# Patient Record
Sex: MALE | Race: WHITE | NOT HISPANIC OR LATINO | Employment: FULL TIME | URBAN - METROPOLITAN AREA
[De-identification: names, ages, dates, MRNs, and addresses within clinical notes are randomized per-mention and may not be internally consistent; named-entity substitution may affect disease eponyms.]

---

## 2017-06-13 DIAGNOSIS — B00.1 RECURRENT COLD SORES: ICD-10-CM

## 2017-06-13 RX ORDER — VALACYCLOVIR HYDROCHLORIDE 500 MG/1
500 TABLET, FILM COATED ORAL DAILY
Qty: 30 TABLET | Refills: 0 | Status: SHIPPED | OUTPATIENT
Start: 2017-06-13 | End: 2017-07-10

## 2017-06-13 NOTE — TELEPHONE ENCOUNTER
Patient calling requesting refill Valtrex. Advised he needed yearly exam and this was scheduled.   Medication is being filled for 1 time refill only due to:  Patient needs to be seen because it has been more than one year since last visit.     Gabbie Fleming RN

## 2017-07-10 ENCOUNTER — OFFICE VISIT (OUTPATIENT)
Dept: PEDIATRICS | Facility: CLINIC | Age: 37
End: 2017-07-10
Payer: COMMERCIAL

## 2017-07-10 VITALS
WEIGHT: 178.1 LBS | BODY MASS INDEX: 26.38 KG/M2 | DIASTOLIC BLOOD PRESSURE: 68 MMHG | TEMPERATURE: 97.6 F | OXYGEN SATURATION: 97 % | HEART RATE: 65 BPM | SYSTOLIC BLOOD PRESSURE: 104 MMHG | HEIGHT: 69 IN

## 2017-07-10 DIAGNOSIS — B00.1 RECURRENT COLD SORES: ICD-10-CM

## 2017-07-10 DIAGNOSIS — L30.9 ECZEMA, UNSPECIFIED TYPE: Primary | ICD-10-CM

## 2017-07-10 PROCEDURE — 99213 OFFICE O/P EST LOW 20 MIN: CPT | Performed by: NURSE PRACTITIONER

## 2017-07-10 RX ORDER — VALACYCLOVIR HYDROCHLORIDE 500 MG/1
500 TABLET, FILM COATED ORAL DAILY
Qty: 90 TABLET | Refills: 3 | Status: SHIPPED | OUTPATIENT
Start: 2017-07-10 | End: 2018-07-28

## 2017-07-10 NOTE — PROGRESS NOTES
"  SUBJECTIVE:                                                    Gagan Herrera is a 36 year old male who presents to clinic today for the following health issues:    Medication Followup of Valcyclovir    Taking Medication as prescribed: yes    Side Effects:  None    Medication Helping Symptoms:  yes     ROS: const/derm otherwise negative     OBJECTIVE:  /68 (Cuff Size: Adult Regular)  Pulse 65  Temp 97.6  F (36.4  C) (Tympanic)  Ht 5' 8.5\" (1.74 m)  Wt 178 lb 1.6 oz (80.8 kg)  SpO2 97%  BMI 26.69 kg/m2  CONSTITUTIONAL: Alert, well-nourished, well-groomed, NAD  RESP: Lungs CTA. No wheeze, rhonchi, rales.  CV: HRRR S1 S2 No MRG. No peripheral edema  DERM: No lesions.     ASSESSMENT/PLAN:  (B00.1) Recurrent cold sores  Comment: Controlled on Valtrex daily.   Plan: valACYclovir (VALTREX) 500 MG tablet            (L30.9) Eczema, unspecified type  (primary encounter diagnosis)  Comment: Uses steroid cream PRN.   Plan: Ok to refill if he calls when needing a new tube of cream     Samantha Orozco, FNP-DNP.        "

## 2017-07-10 NOTE — NURSING NOTE
"Chief Complaint   Patient presents with     Recheck Medication     valcyclovir       Initial /68 (Cuff Size: Adult Regular)  Pulse 65  Temp 97.6  F (36.4  C) (Tympanic)  Ht 5' 8.5\" (1.74 m)  Wt 178 lb 1.6 oz (80.8 kg)  SpO2 97%  BMI 26.69 kg/m2 Estimated body mass index is 26.69 kg/(m^2) as calculated from the following:    Height as of this encounter: 5' 8.5\" (1.74 m).    Weight as of this encounter: 178 lb 1.6 oz (80.8 kg).  Medication Reconciliation: complete   Dena Frank CMA    "

## 2017-07-10 NOTE — MR AVS SNAPSHOT
"              After Visit Summary   7/10/2017    Gagan Herrera    MRN: [id number]           Patient Information     Date Of Birth          1980        Visit Information        Provider Department      7/10/2017 11:40 AM Samantha Orozco APRN CNP [facility]        Today's Diagnoses     Eczema, unspecified type    -  1    Recurrent cold sores           Follow-ups after your visit        Who to contact     If you have questions or need follow up information about today's clinic visit or your schedule please contact [facility] directly at [phone].  Normal or non-critical lab and imaging results will be communicated to you by [email or web address], letter or phone within 4 business days after the clinic has received the results. If you do not hear from us within 7 days, please contact the clinic through [email or web address] or phone. If you have a critical or abnormal lab result, we will notify you by phone as soon as possible.  Submit refill requests through [employer] or call your pharmacy and they will forward the refill request to us. Please allow 3 business days for your refill to be completed.          Additional Information About Your Visit        MyChart Information     [employer] lets you send messages to your doctor, view your test results, renew your prescriptions, schedule appointments and more. To sign up, go to [email or web address] . Click on \"Log in\" on the left side of the screen, which will take you to the Welcome page. Then click on \"Sign up Now\" on the right side of the page.     You will be asked to enter the access code listed below, as well as some personal information. Please follow the directions to create your username and password.     Your access code is: [email or web address]  Expires: 2017  1:13 PM     Your access code will  in 90 days. If you need help or a new code, please call your [facility] or [phone].        Care EveryWhere ID     This is your Care EveryWhere ID. " "This could be used by other organizations to access your [place] medical records  [id number]        Your Vitals Were     Pulse Temperature Height Pulse Oximetry BMI (Body Mass Index)       65 97.6  F (36.4  C) (Tympanic) 5' 8.5\" (1.74 m) 97% 26.69 kg/m2        Blood Pressure from Last 3 Encounters:   07/10/17 104/68   12/09/16 106/67   05/27/16 90/58    Weight from Last 3 Encounters:   07/10/17 178 lb 1.6 oz (80.8 kg)   12/09/16 172 lb 8 oz (78.2 kg)   05/27/16 162 lb (73.5 kg)              Today, you had the following     No orders found for display         Where to get your medicines      These medications were sent to [id number]  [place] - [address] AT [facility]  [address] [place] [address]     Phone:  [phone]     valACYclovir 500 MG tablet          Primary Care Provider Office Phone # Fax #    Cherie Magana [phone]       [address] [place] [address]        Equal Access to Services     LUIS GARY AH: Hadii luis ku hadasho Soomaali, waaxda luqadaha, qaybta kaalmada adeegyada, melissa connors. So [facility] [phone].    ATENCIÓN: Si habla español, tiene a aly disposición servicios gratuitos de asistencia lingüística. Llame al [phone].    We comply with applicable federal civil rights laws and [place] laws. We do not discriminate on the basis of race, color, national origin, age, disability sex, sexual orientation or gender identity.            Thank you!     Thank you for choosing [facility]  for your care. Our goal is always to provide you with excellent care. Hearing back from our patients is one way we can continue to improve our services. Please take a few minutes to complete the written survey that you may receive in the mail after your visit with us. Thank you!             Your Updated Medication List - Protect others around you: Learn how to safely " use, store and throw away your medicines at www.disposemymeds.org.          This list is accurate as of: 7/10/17 11:59 AM.  Always use your most recent med list.                   Brand Name Dispense Instructions for use Diagnosis    mometasone 0.1 % cream    ELOCON    15 g    Apply topically daily as needed    Eczema       valACYclovir 500 MG tablet    VALTREX    90 tablet    Take 1 tablet (500 mg) by mouth daily    Recurrent cold sores

## 2017-07-11 ASSESSMENT — PATIENT HEALTH QUESTIONNAIRE - PHQ9: SUM OF ALL RESPONSES TO PHQ QUESTIONS 1-9: 5

## 2017-08-18 ENCOUNTER — TRANSFERRED RECORDS (OUTPATIENT)
Dept: HEALTH INFORMATION MANAGEMENT | Facility: CLINIC | Age: 37
End: 2017-08-18

## 2017-08-21 ENCOUNTER — NURSE TRIAGE (OUTPATIENT)
Dept: NURSING | Facility: CLINIC | Age: 37
End: 2017-08-21

## 2017-08-21 ENCOUNTER — HOSPITAL ENCOUNTER (EMERGENCY)
Facility: CLINIC | Age: 37
Discharge: HOME | End: 2017-08-21
Attending: EMERGENCY MEDICINE | Admitting: EMERGENCY MEDICINE
Payer: COMMERCIAL

## 2017-08-21 ENCOUNTER — APPOINTMENT (OUTPATIENT)
Dept: ULTRASOUND IMAGING | Facility: CLINIC | Age: 37
End: 2017-08-21
Attending: EMERGENCY MEDICINE
Payer: COMMERCIAL

## 2017-08-21 VITALS
TEMPERATURE: 97.6 F | SYSTOLIC BLOOD PRESSURE: 122 MMHG | DIASTOLIC BLOOD PRESSURE: 68 MMHG | RESPIRATION RATE: 16 BRPM | HEART RATE: 73 BPM | OXYGEN SATURATION: 99 %

## 2017-08-21 DIAGNOSIS — N10 ACUTE PYELONEPHRITIS: ICD-10-CM

## 2017-08-21 LAB
ALBUMIN UR-MCNC: 100 MG/DL
ANION GAP SERPL CALCULATED.3IONS-SCNC: 5 MMOL/L (ref 3–14)
APPEARANCE UR: ABNORMAL
BACTERIA #/AREA URNS HPF: ABNORMAL /HPF
BASOPHILS # BLD AUTO: 0 10E9/L (ref 0–0.2)
BASOPHILS NFR BLD AUTO: 0.3 %
BILIRUB UR QL STRIP: NEGATIVE
BUN SERPL-MCNC: 12 MG/DL (ref 7–30)
CALCIUM SERPL-MCNC: 8.9 MG/DL (ref 8.5–10.1)
CHLORIDE SERPL-SCNC: 98 MMOL/L (ref 94–109)
CO2 SERPL-SCNC: 29 MMOL/L (ref 20–32)
COLOR UR AUTO: ABNORMAL
CREAT SERPL-MCNC: 0.92 MG/DL (ref 0.66–1.25)
DIFFERENTIAL METHOD BLD: ABNORMAL
EOSINOPHIL # BLD AUTO: 0 10E9/L (ref 0–0.7)
EOSINOPHIL NFR BLD AUTO: 0.3 %
ERYTHROCYTE [DISTWIDTH] IN BLOOD BY AUTOMATED COUNT: 11.5 % (ref 10–15)
GFR SERPL CREATININE-BSD FRML MDRD: >90 ML/MIN/1.7M2
GLUCOSE SERPL-MCNC: 101 MG/DL (ref 70–99)
GLUCOSE UR STRIP-MCNC: NEGATIVE MG/DL
HCT VFR BLD AUTO: 40.2 % (ref 40–53)
HGB BLD-MCNC: 14 G/DL (ref 13.3–17.7)
HGB UR QL STRIP: ABNORMAL
IMM GRANULOCYTES # BLD: 0 10E9/L (ref 0–0.4)
IMM GRANULOCYTES NFR BLD: 0.3 %
KETONES UR STRIP-MCNC: NEGATIVE MG/DL
LEUKOCYTE ESTERASE UR QL STRIP: ABNORMAL
LYMPHOCYTES # BLD AUTO: 0.9 10E9/L (ref 0.8–5.3)
LYMPHOCYTES NFR BLD AUTO: 7.7 %
MCH RBC QN AUTO: 30.5 PG (ref 26.5–33)
MCHC RBC AUTO-ENTMCNC: 34.8 G/DL (ref 31.5–36.5)
MCV RBC AUTO: 88 FL (ref 78–100)
MONOCYTES # BLD AUTO: 1.1 10E9/L (ref 0–1.3)
MONOCYTES NFR BLD AUTO: 9.7 %
MUCOUS THREADS #/AREA URNS LPF: PRESENT /LPF
NEUTROPHILS # BLD AUTO: 9.3 10E9/L (ref 1.6–8.3)
NEUTROPHILS NFR BLD AUTO: 81.7 %
NITRATE UR QL: POSITIVE
NRBC # BLD AUTO: 0 10*3/UL
NRBC BLD AUTO-RTO: 0 /100
PH UR STRIP: 7 PH (ref 5–7)
PLATELET # BLD AUTO: 205 10E9/L (ref 150–450)
POTASSIUM SERPL-SCNC: 4.5 MMOL/L (ref 3.4–5.3)
RBC # BLD AUTO: 4.59 10E12/L (ref 4.4–5.9)
RBC #/AREA URNS AUTO: 4 /HPF (ref 0–2)
SODIUM SERPL-SCNC: 132 MMOL/L (ref 133–144)
SOURCE: ABNORMAL
SP GR UR STRIP: 1.01 (ref 1–1.03)
UROBILINOGEN UR STRIP-MCNC: 4 MG/DL (ref 0–2)
WBC # BLD AUTO: 11.4 10E9/L (ref 4–11)
WBC #/AREA URNS AUTO: >182 /HPF (ref 0–2)
WBC CLUMPS #/AREA URNS HPF: PRESENT /HPF

## 2017-08-21 PROCEDURE — 85025 COMPLETE CBC W/AUTO DIFF WBC: CPT | Performed by: EMERGENCY MEDICINE

## 2017-08-21 PROCEDURE — 96361 HYDRATE IV INFUSION ADD-ON: CPT

## 2017-08-21 PROCEDURE — 87086 URINE CULTURE/COLONY COUNT: CPT | Performed by: EMERGENCY MEDICINE

## 2017-08-21 PROCEDURE — 96365 THER/PROPH/DIAG IV INF INIT: CPT

## 2017-08-21 PROCEDURE — 81001 URINALYSIS AUTO W/SCOPE: CPT | Performed by: EMERGENCY MEDICINE

## 2017-08-21 PROCEDURE — 87088 URINE BACTERIA CULTURE: CPT | Performed by: EMERGENCY MEDICINE

## 2017-08-21 PROCEDURE — 99285 EMERGENCY DEPT VISIT HI MDM: CPT | Mod: 25

## 2017-08-21 PROCEDURE — 87186 SC STD MICRODIL/AGAR DIL: CPT | Performed by: EMERGENCY MEDICINE

## 2017-08-21 PROCEDURE — 96375 TX/PRO/DX INJ NEW DRUG ADDON: CPT

## 2017-08-21 PROCEDURE — 25000128 H RX IP 250 OP 636: Performed by: EMERGENCY MEDICINE

## 2017-08-21 PROCEDURE — 76770 US EXAM ABDO BACK WALL COMP: CPT

## 2017-08-21 PROCEDURE — 80048 BASIC METABOLIC PNL TOTAL CA: CPT | Performed by: EMERGENCY MEDICINE

## 2017-08-21 PROCEDURE — 25000132 ZZH RX MED GY IP 250 OP 250 PS 637: Performed by: EMERGENCY MEDICINE

## 2017-08-21 RX ORDER — KETOROLAC TROMETHAMINE 30 MG/ML
30 INJECTION, SOLUTION INTRAMUSCULAR; INTRAVENOUS ONCE
Status: COMPLETED | OUTPATIENT
Start: 2017-08-21 | End: 2017-08-21

## 2017-08-21 RX ORDER — CEFTRIAXONE 1 G/1
1 INJECTION, POWDER, FOR SOLUTION INTRAMUSCULAR; INTRAVENOUS ONCE
Status: COMPLETED | OUTPATIENT
Start: 2017-08-21 | End: 2017-08-21

## 2017-08-21 RX ORDER — CEPHALEXIN 500 MG/1
500 CAPSULE ORAL 4 TIMES DAILY
Qty: 28 CAPSULE | Refills: 0 | Status: SHIPPED | OUTPATIENT
Start: 2017-08-21 | End: 2017-08-28

## 2017-08-21 RX ORDER — ONDANSETRON 2 MG/ML
4 INJECTION INTRAMUSCULAR; INTRAVENOUS ONCE
Status: COMPLETED | OUTPATIENT
Start: 2017-08-21 | End: 2017-08-21

## 2017-08-21 RX ORDER — SODIUM CHLORIDE 9 MG/ML
1000 INJECTION, SOLUTION INTRAVENOUS CONTINUOUS
Status: DISCONTINUED | OUTPATIENT
Start: 2017-08-21 | End: 2017-08-21 | Stop reason: HOSPADM

## 2017-08-21 RX ADMIN — SODIUM CHLORIDE 1000 ML: 9 INJECTION, SOLUTION INTRAVENOUS at 08:25

## 2017-08-21 RX ADMIN — FEXOFENADINE HYDROCHLORIDE 60 MG: 30 TABLET, ORALLY DISINTEGRATING ORAL at 10:34

## 2017-08-21 RX ADMIN — KETOROLAC TROMETHAMINE 30 MG: 30 INJECTION, SOLUTION INTRAMUSCULAR at 08:26

## 2017-08-21 RX ADMIN — CEFTRIAXONE SODIUM 1 G: 1 INJECTION, POWDER, FOR SOLUTION INTRAMUSCULAR; INTRAVENOUS at 09:50

## 2017-08-21 RX ADMIN — ONDANSETRON 4 MG: 2 INJECTION INTRAMUSCULAR; INTRAVENOUS at 08:25

## 2017-08-21 ASSESSMENT — ENCOUNTER SYMPTOMS
HEADACHES: 1
FLANK PAIN: 1
ABDOMINAL PAIN: 1
CHILLS: 1
HEMATURIA: 0
NAUSEA: 1
DIAPHORESIS: 1

## 2017-08-21 NOTE — DISCHARGE INSTRUCTIONS
"   * BLADDER INFECTION: Male (Adult)    A bladder infection (\"cystitis\" or \"UTI\") usually causes a constant urge to urinate, and a burning when passing urine. Urine may be cloudy, smelly or dark. There may be also be pain in the lower abdomen.  Cystitis in males is not common. It may be caused by a partial blockage in the urinary system that keeps the bladder from emptying completely. This is most often related to an enlarged prostate gland.  HOME CARE:  1. Drink lots of fluids (at least 6-8 glasses a day). This will flush the bacteria out of your bladder. Cranberry juice has been shown to help clear out the bacteria.  2. Avoid sexual intercourse until your symptoms are gone.  3. A bladder infection is treated with antibiotics. You may also be given Pyridium (generic - phenazopyridine) to reduce burning with urination. This will cause urine to become a bright orange color, which can stain clothing.  FOLLOW UP with your doctor or this facility if ALL symptoms have not cleared within five days. It is important to keep your follow up appointment to discuss with your doctor the need for further tests of the urinary tract.  GET PROMPT MEDICAL ATTENTION if any of the following occur:    Fever over 101  F (38.3  C)    No improvement by the third day of treatment    Increasing back or abdominal pain    Repeated vomiting; unable to keep medicine down    Weakness, dizziness or fainting    9966-5047 [employer], [address] [place] [address], [place] [address]. All rights reserved. This information is not intended as a substitute for professional medical care. Always follow your healthcare professional's instructions.    "

## 2017-08-21 NOTE — ED AVS SNAPSHOT
[facility] Emergency Department    [address]    [facility] [address]    Phone:  [phone]    Fax:  [phone]                                       Gagan Herrera   MRN: [id number]    Department:  [facility] Emergency Department   Date of Visit:  8/21/2017           After Visit Summary Signature Page     I have received my discharge instructions, and my questions have been answered. I have discussed any challenges I see with this plan with the nurse or doctor.    ..........................................................................................................................................  Patient/Patient Representative Signature      ..........................................................................................................................................  Patient Representative Print Name and Relationship to Patient    ..................................................               ................................................  Date                                            Time    ..........................................................................................................................................  Reviewed by Signature/Title    ...................................................              ..............................................  Date                                                            Time

## 2017-08-21 NOTE — ED NOTES
Pt arrives with c/o R flank pain that started today. Pt dx on Friday nigth dx with uti bactrum, pyridium. Nausea, no vomiting, blood in urine. ABC intact.

## 2017-08-21 NOTE — ED PROVIDER NOTES
History     Chief Complaint:  UTI    The history is provided by the patient.      Gagan Herrera is a 36 year old male who presents with symptoms of a UTI. The patient states he went his local urgency room 3 days ago, where he was diagnosed with a UTI and given oral bactrim. He has since noted flank pain, which is a new symptom, as well as a continuation of his previous symptoms, prompting his visit to the emergency department. These symptoms include abdominal tenderness, chills, headache, night sweats, and nausea. The patient denies blood in the urine, and states no other concerns at this time. He has taken some of his wife's old Pyridium for his pain.     Allergies:  No known drug allergies.      Medications:    Valtrex   Elocon     Past Medical History:    History reviewed.  No significant past medical history.      Past Surgical History:     Appendectomy   Sinus surgery     Family History:    Cancer  MI  Chronic obstructive pulmonary disease  Adopted    Social History:  Presents to the emergency department with his family  Marital Status:  Single  Smoking status: Former smoker  Alcohol use: Yes      Review of Systems   Constitutional: Positive for chills and diaphoresis.   HENT: Positive for congestion.    Gastrointestinal: Positive for abdominal pain and nausea.   Genitourinary: Positive for flank pain. Negative for hematuria.   Neurological: Positive for headaches.   All other systems reviewed and are negative.    Physical Exam     Patient Vitals for the past 24 hrs:   BP Temp Temp src Heart Rate Resp SpO2   08/21/17 0800 124/72 - - - - 98 %   08/21/17 0750 120/86 97.6  F (36.4  C) Oral 91 12 95 %         Physical Exam  Constitutional: Patient appears well-developed and well-nourished. Patient is in mild distress  HENT:                         Head: No external signs of trauma noted.                        Eyes: Conjunctivae are normal. Pupils are equal, round, and reactive to light.              Nose: Nasal  congestion noted.  Cardiovascular:                         Normal rate, regular rhythm and normal heart sounds.                          Exam reveals no friction rub.                          No murmur heard.  Pulmonary/Chest:                         Effort normal and breath sounds normal.                         No respiratory distress.                         There are no wheezes.                         There are no rales.   Abdominal:                         Soft.                         Bowel sounds normal.                         There is no distension.                         There is no tenderness.                         There is no rebound or guarding.   Musculoskeletal:                         Normal range of motion.                         Normal Tone                        There is very mild L>R CVA tenderness  Neurological: Patient is alert and oriented to person, place, and time.   Skin: Skin is warm and dry. Patient is not diaphoretic.     Emergency Department Course   Imaging:  Radiology findings were communicated with the patient who voiced understanding of the findings.  US Retroperitoneal:  IMPRESSION: There is increased echogenicity of the urine in the  urinary bladder and in the renal pelves bilaterally. Renal ultrasound  otherwise normal.  Report per radiology      Laboratory:  Laboratory findings were communicated with the patient who voiced understanding of the findings.  CBC: WBC 11.4 (H), o/w WNL. (HGB 14.0, )   BMP:  (L), glucose 101 (H), o/w WNL (Creatinine 0.92)   UA: Clear yellow urine, large blood, almbumin 100, urobillinogen 4.0 (H), nitrite positive, larger leuk esterase, WBC > 182, RBC 4 (H), WBC clumps present, bacteria many, mucus present, otherwise WNL    Urine Culture Aerobic Bacterial: Pending     Interventions:  0825: NS Bolus 1,000mL IV   0825: Zofran, 4 mg, IV   0826: Toradol, 30 mg, IV injection   1031: Rocephin 1 g in 100 mL NS  1034: Allegra ODT 60 mg       Emergency Department Course:  Nursing notes and vitals reviewed.  I performed an exam of the patient as documented above.   The patient was sent for a US Retroperitoneal while in the emergency department, results above.   IV was inserted and blood was drawn for laboratory testing, results above.  The patient provided a urine sample here in the emergency department. This was sent for laboratory testing, findings above.  0909: I spoke with Dr. Magana of the internal medicine service regarding patient's presentation, findings, and plan of care.    0917: I spoke with the [facility] nurse regarding patient's presentation, findings, and plan of care. They could not find specificity testing. Upon review of the [facility] note, they did find the patient's infection to be 10,000-50,000 E-Coli cfu/mL.  0936: Patient rechecked and updated.   I discussed the treatment plan with the patient. They expressed understanding of this plan and consented to discharge. They will be discharged home with instructions for care and follow up. In addition, the patient will return to the emergency department if their symptoms persist, worsen, if new symptoms arise or if there is any concern.  All questions were answered.     I personally reviewed the laboratory and imaging results with the Patient and answered all related questions prior to discharge.      Impression & Plan      Medical Decision Making:   Gagan Herrera is a 36 year old male who presents for evaluation of back pain. The patient initially stated he went to his PCP last firday, where he was diagnosed with a urinary tract infection. He states that since being on bactrim he has had chills over the wekened and increasing flank pain which he did not have on Friday. His blood work demonstrates a very mild elevation in his white blood cell count. Ultrasound does not reveal any hydronephrosis, but does note what is probably cloudy urine in the bladder. I called his primary care  clinic, but he did not get care there. Upon Care Everywhere review, he was seen at the [facility] in [place]. I discussed the UA and UCx with the nurse there, who could not find any antibiotic testing, but could see the culture grew 10,000-50,000 E-Coli CFU/mL. The patient mentioned that his wife fell ill with a UTI several months ago and her antibiotics needed to be changed as well. He states that she waited long enough that she actually had to be admitted for 4 days for IV abx. He wondered if intercourse with her could have given him the UTI. There is no report of discharge or STI risks. The patient feels well and I believe is stable for discharge. I will change his Abx and await further susceptibility testing from his UA today. At this time, we will discharge the patient home and he will follow up in the outpatient setting. Anticipatory guidance given prior to discharge.     Diagnosis:    ICD-10-CM    1. Acute pyelonephritis N10       Disposition:   Discharged to home with the below prescription      Discharge Medications:  New Prescriptions    CEPHALEXIN (KEFLEX) 500 MG CAPSULE    Take 1 capsule (500 mg) by mouth 4 times daily for 7 days     Scribe Disclosure:  William CATES, am serving as a scribe at 7:47 AM on 8/21/2017 to document services personally performed by Aashish Shen DO, based on my observations and the provider's statements to me.   8/21/2017   [facility] EMERGENCY DEPARTMENT       Aashish Shen DO  08/21/17 1100

## 2017-08-21 NOTE — ED AVS SNAPSHOT
" [facility]    [address]    [facility] [address]    Phone:  [phone]    Fax:  [phone]                                       Gagan Herrera   MRN: [id number]    Department:  [facility]   Date of Visit:  8/21/2017           Patient Information     Date Of Birth          1980        Your diagnoses for this visit were:     Acute pyelonephritis        You were seen by Aashish Shen DO.      Follow-up Information     Follow up with Cherie Magana MD. Call in 2 days.    Specialties:  Internal Medicine, Pediatrics    Why:  For re-evaluation    Contact information:    [facility] [place] [address] [phone]          Follow up with Your urologist.    Why:  For further evaluation        Follow up with [facility].    Specialty:  EMERGENCY MEDICINE    Why:  If symptoms worsen    Contact information:    [address]  [facility] [id number] [phone]        Discharge Instructions          * BLADDER INFECTION: Male (Adult)    A bladder infection (\"cystitis\" or \"UTI\") usually causes a constant urge to urinate, and a burning when passing urine. Urine may be cloudy, smelly or dark. There may be also be pain in the lower abdomen.  Cystitis in males is not common. It may be caused by a partial blockage in the urinary system that keeps the bladder from emptying completely. This is most often related to an enlarged prostate gland.  HOME CARE:  1. Drink lots of fluids (at least 6-8 glasses a day). This will flush the bacteria out of your bladder. Cranberry juice has been shown to help clear out the bacteria.  2. Avoid sexual intercourse until your symptoms are gone.  3. A bladder infection is treated with antibiotics. You may also be given Pyridium (generic - phenazopyridine) to reduce burning with urination. This will cause urine to become a bright " orange color, which can stain clothing.  FOLLOW UP with your doctor or this facility if ALL symptoms have not cleared within five days. It is important to keep your follow up appointment to discuss with your doctor the need for further tests of the urinary tract.  GET PROMPT MEDICAL ATTENTION if any of the following occur:    Fever over 101  F (38.3  C)    No improvement by the third day of treatment    Increasing back or abdominal pain    Repeated vomiting; unable to keep medicine down    Weakness, dizziness or fainting    5188-1050 [employer], [address] [place] [address], [place] [address]. All rights reserved. This information is not intended as a substitute for professional medical care. Always follow your healthcare professional's instructions.      Future Appointments        Provider Department Dept Phone Center    8/29/2017 8:00 AM Opal Messer PA-C, [facility] [place] [phone]  [place]      24 Hour Appointment Hotline       To make an appointment at any [facility], call [phone] ([phone]). If you don't have a family doctor or clinic, we will help you find one. [facility] are conveniently located to serve the needs of you and your family.             Review of your medicines      START taking        Dose / Directions Last dose taken    cephALEXin 500 MG capsule   Commonly known as:  KEFLEX   Dose:  500 mg   Quantity:  28 capsule        Take 1 capsule (500 mg) by mouth 4 times daily for 7 days   Refills:  0          Our records show that you are taking the medicines listed below. If these are incorrect, please call your family doctor or clinic.        Dose / Directions Last dose taken    mometasone 0.1 % cream   Commonly known as:  ELOCON   Quantity:  15 g        Apply topically daily as needed   Refills:  1        PYRIDIUM PO        Refills:  0        valACYclovir 500 MG tablet   Commonly known as:  VALTREX   Dose:  500 mg    Quantity:  90 tablet        Take 1 tablet (500 mg) by mouth daily   Refills:  3          STOP taking        Dose Reason for stopping Comments    BACTRIM PO                      Prescriptions were sent or printed at these locations (1 Prescription)                   Other Prescriptions                Printed at Department/Unit printer (1 of 1)         cephALEXin (KEFLEX) 500 MG capsule                Procedures and tests performed during your visit     Basic metabolic panel    CBC with platelets differential    Peripheral IV catheter    Retroperitoneal US    UA with Microscopic    Urine Culture      Orders Needing Specimen Collection     None      Pending Results     Date and Time Order Name Status Description    8/21/2017 0836 Urine Culture In process             Pending Culture Results     Date and Time Order Name Status Description    8/21/2017 0836 Urine Culture In process             Pending Results Instructions     If you had any lab results that were not finalized at the time of your Discharge, you can call the ED Lab Result RN at [phone]. You will be contacted by this team for any positive Lab results or changes in treatment. The nurses are available 7 days a week from 10A to 6:30P.  You can leave a message 24 hours per day and they will return your call.        Test Results From Your Hospital Stay        8/21/2017  8:48 AM      Component Results     Component Value Ref Range & Units Status    WBC 11.4 (H) 4.0 - 11.0 10e9/L Final    RBC Count 4.59 4.4 - 5.9 10e12/L Final    Hemoglobin 14.0 13.3 - 17.7 g/dL Final    Hematocrit 40.2 40.0 - 53.0 % Final    MCV 88 78 - 100 fl Final    MCH 30.5 26.5 - 33.0 pg Final    MCHC 34.8 31.5 - 36.5 g/dL Final    RDW 11.5 10.0 - 15.0 % Final    Platelet Count 205 150 - 450 10e9/L Final    Diff Method Automated Method  Final    % Neutrophils 81.7 % Final    % Lymphocytes 7.7 % Final    % Monocytes 9.7 % Final    % Eosinophils 0.3 % Final    % Basophils 0.3 % Final     % Immature Granulocytes 0.3 % Final    Nucleated RBCs 0 0 /100 Final    Absolute Neutrophil 9.3 (H) 1.6 - 8.3 10e9/L Final    Absolute Lymphocytes 0.9 0.8 - 5.3 10e9/L Final    Absolute Monocytes 1.1 0.0 - 1.3 10e9/L Final    Absolute Eosinophils 0.0 0.0 - 0.7 10e9/L Final    Absolute Basophils 0.0 0.0 - 0.2 10e9/L Final    Abs Immature Granulocytes 0.0 0 - 0.4 10e9/L Final    Absolute Nucleated RBC 0.0  Final         8/21/2017  9:01 AM      Component Results     Component Value Ref Range & Units Status    Sodium 132 (L) 133 - 144 mmol/L Final    Potassium 4.5 3.4 - 5.3 mmol/L Final    Chloride 98 94 - 109 mmol/L Final    Carbon Dioxide 29 20 - 32 mmol/L Final    Anion Gap 5 3 - 14 mmol/L Final    Glucose 101 (H) 70 - 99 mg/dL Final    Urea Nitrogen 12 7 - 30 mg/dL Final    Creatinine 0.92 0.66 - 1.25 mg/dL Final    GFR Estimate >90 >60 mL/min/1.7m2 Final    Non  GFR Calc    GFR Estimate If Black >90 >60 mL/min/1.7m2 Final    African American GFR Calc    Calcium 8.9 8.5 - 10.1 mg/dL Final         8/21/2017  9:04 AM      Narrative     ULTRASOUND RETROPERITONEAL COMPLETE  8/21/2017 8:51 AM     HISTORY: Flank pain, possible pyelonephritis.    COMPARISON: None.    FINDINGS: The bilateral renal parenchyma are normal in echogenicity  without evidence for shadowing stone or mass. No renal cysts. No  hydronephrosis. Right kidney measures 10.7 x 5.0 x 5.2 cm and the left  measures 10.3 x 4.4 x 4.8 cm. Cortical thickness is 1.5 cm on the  right and 1.6 cm on the left. Bladder is normal in appearance but the  urine is cloudy. There is also cloudy urine in the visualized portions  of bilateral renal pelves.        Impression     IMPRESSION: There is increased echogenicity of the urine in the  urinary bladder and in the renal pelves bilaterally. Renal ultrasound  otherwise normal.    KIZZY KAISER MD         8/21/2017  9:14 AM      Component Results     Component Value Ref Range & Units Status    Color Urine  Gracie  Final    Appearance Urine Cloudy  Final    Glucose Urine Negative NEG^Negative mg/dL Final    Bilirubin Urine Negative NEG^Negative Final    Ketones Urine Negative NEG^Negative mg/dL Final    Specific Gravity Urine 1.006 1.003 - 1.035 Final    Blood Urine Large (A) NEG^Negative Final    pH Urine 7.0 5.0 - 7.0 pH Final    Protein Albumin Urine 100 (A) NEG^Negative mg/dL Final    Urobilinogen mg/dL 4.0 (H) 0.0 - 2.0 mg/dL Final    Nitrite Urine Positive (A) NEG^Negative Final    Leukocyte Esterase Urine Large (A) NEG^Negative Final    Source Midstream Urine  Final    WBC Urine >182 (H) 0 - 2 /HPF Final    RBC Urine 4 (H) 0 - 2 /HPF Final    WBC Clumps Present (A) NEG^Negative /HPF Final    Bacteria Urine Many (A) NEG^Negative /HPF Final    Mucous Urine Present (A) NEG^Negative /LPF Final         8/21/2017  9:04 AM                Clinical Quality Measure: Blood Pressure Screening     Your blood pressure was checked while you were in the emergency department today. The last reading we obtained was  BP: 124/72 . Please read the guidelines below about what these numbers mean and what you should do about them.  If your systolic blood pressure (the top number) is less than 120 and your diastolic blood pressure (the bottom number) is less than 80, then your blood pressure is normal. There is nothing more that you need to do about it.  If your systolic blood pressure (the top number) is 120-139 or your diastolic blood pressure (the bottom number) is 80-89, your blood pressure may be higher than it should be. You should have your blood pressure rechecked within a year by a primary care provider.  If your systolic blood pressure (the top number) is 140 or greater or your diastolic blood pressure (the bottom number) is 90 or greater, you may have high blood pressure. High blood pressure is treatable, but if left untreated over time it can put you at risk for heart attack, stroke, or kidney failure. You should have your  "blood pressure rechecked by a primary care provider within the next 4 weeks.  If your provider in the emergency department today gave you specific instructions to follow-up with your doctor or provider even sooner than that, you should follow that instruction and not wait for up to 4 weeks for your follow-up visit.        Thank you for choosing [facility]       Thank you for choosing [facility] for your care. Our goal is always to provide you with excellent care. Hearing back from our patients is one way we can continue to improve our services. Please take a few minutes to complete the written survey that you may receive in the mail after you visit with us. Thank you!        [employer] Information     [employer] lets you send messages to your doctor, view your test results, renew your prescriptions, schedule appointments and more. To sign up, go to [email or web address] . Click on \"Log in\" on the left side of the screen, which will take you to the Welcome page. Then click on \"Sign up Now\" on the right side of the page.     You will be asked to enter the access code listed below, as well as some personal information. Please follow the directions to create your username and password.     Your access code is: [email or web address]  Expires: 2017  1:13 PM     Your access code will  in 90 days. If you need help or a new code, please call your [facility] clinic or [phone].        Care EveryWhere ID     This is your Care EveryWhere ID. This could be used by other organizations to access your [facility] medical records  [id number]        Equal Access to Services     BULMARO GARY : Hadii luis goodman hadasho Soomaali, waaxda luqadaha, qaybta kaalmada hollandyada, melissa ellis . So [facility] [phone].    ATENCIÓN: Si habla español, tiene a aly disposición servicios gratuitos de asistencia lingüística. Llame al [phone].    We comply with applicable federal civil rights laws and [place] laws. We do not " discriminate on the basis of race, color, national origin, age, disability sex, sexual orientation or gender identity.            After Visit Summary       This is your record. Keep this with you and show to your community pharmacist(s) and doctor(s) at your next visit.

## 2017-08-22 LAB
BACTERIA SPEC CULT: ABNORMAL
Lab: ABNORMAL
SPECIMEN SOURCE: ABNORMAL

## 2017-08-22 NOTE — TELEPHONE ENCOUNTER
"  Additional Information    Negative: [1] Caller is not with the adult (patient) AND [2] reporting urgent symptoms    Negative: Lab result questions    Negative: Medication questions    Negative: Caller cannot be reached by phone    Negative: Caller has already spoken to PCP or another triager    Negative: RN needs further essential information from caller in order to complete triage    Negative: Requesting regular office appointment    Negative: [1] Caller requesting NON-URGENT health information AND [2] PCP's office is the best resource    Health Information question, no triage required and triager able to answer question    Answer Assessment - Initial Assessment Questions  1. REASON FOR CALL or QUESTION: \"What is your reason for calling today?\" or \"How can I best help you?\" or \"What question do you have that I can help answer?\"      Is it OK to take current antibiotic with his Valtrex?    Protocols used: INFORMATION ONLY CALL-ADULT-    "

## 2017-08-23 ENCOUNTER — TRANSFERRED RECORDS (OUTPATIENT)
Dept: HEALTH INFORMATION MANAGEMENT | Facility: CLINIC | Age: 37
End: 2017-08-23

## 2017-08-25 DIAGNOSIS — N39.0 URINARY TRACT INFECTION, SITE UNSPECIFIED: Primary | ICD-10-CM

## 2017-08-29 ENCOUNTER — NURSE TRIAGE (OUTPATIENT)
Dept: NURSING | Facility: CLINIC | Age: 37
End: 2017-08-29

## 2017-08-29 ENCOUNTER — TELEPHONE (OUTPATIENT)
Dept: PEDIATRICS | Facility: CLINIC | Age: 37
End: 2017-08-29

## 2017-08-29 ENCOUNTER — OFFICE VISIT (OUTPATIENT)
Dept: PEDIATRICS | Facility: CLINIC | Age: 37
End: 2017-08-29
Payer: COMMERCIAL

## 2017-08-29 VITALS
SYSTOLIC BLOOD PRESSURE: 102 MMHG | OXYGEN SATURATION: 98 % | TEMPERATURE: 97.8 F | HEART RATE: 80 BPM | DIASTOLIC BLOOD PRESSURE: 60 MMHG | WEIGHT: 178 LBS | BODY MASS INDEX: 26.67 KG/M2

## 2017-08-29 DIAGNOSIS — N30.00 ACUTE CYSTITIS WITHOUT HEMATURIA: ICD-10-CM

## 2017-08-29 DIAGNOSIS — R82.90 NONSPECIFIC FINDING ON EXAMINATION OF URINE: ICD-10-CM

## 2017-08-29 DIAGNOSIS — R30.0 DYSURIA: Primary | ICD-10-CM

## 2017-08-29 LAB
ALBUMIN UR-MCNC: NEGATIVE MG/DL
APPEARANCE UR: ABNORMAL
BACTERIA #/AREA URNS HPF: ABNORMAL /HPF
BILIRUB UR QL STRIP: NEGATIVE
COLOR UR AUTO: YELLOW
GLUCOSE UR STRIP-MCNC: NEGATIVE MG/DL
HGB UR QL STRIP: NEGATIVE
KETONES UR STRIP-MCNC: NEGATIVE MG/DL
LEUKOCYTE ESTERASE UR QL STRIP: ABNORMAL
NITRATE UR QL: NEGATIVE
PH UR STRIP: 6.5 PH (ref 5–7)
RBC #/AREA URNS AUTO: ABNORMAL /HPF
SOURCE: ABNORMAL
SP GR UR STRIP: 1.01 (ref 1–1.03)
UROBILINOGEN UR STRIP-ACNC: 0.2 EU/DL (ref 0.2–1)
WBC #/AREA URNS AUTO: ABNORMAL /HPF

## 2017-08-29 PROCEDURE — 87086 URINE CULTURE/COLONY COUNT: CPT | Performed by: INTERNAL MEDICINE

## 2017-08-29 PROCEDURE — 87088 URINE BACTERIA CULTURE: CPT | Performed by: INTERNAL MEDICINE

## 2017-08-29 PROCEDURE — 81001 URINALYSIS AUTO W/SCOPE: CPT | Performed by: INTERNAL MEDICINE

## 2017-08-29 PROCEDURE — 99213 OFFICE O/P EST LOW 20 MIN: CPT | Performed by: INTERNAL MEDICINE

## 2017-08-29 PROCEDURE — 87186 SC STD MICRODIL/AGAR DIL: CPT | Performed by: INTERNAL MEDICINE

## 2017-08-29 RX ORDER — CIPROFLOXACIN 500 MG/1
500 TABLET, FILM COATED ORAL 2 TIMES DAILY
Qty: 20 TABLET | Refills: 0 | Status: SHIPPED | OUTPATIENT
Start: 2017-08-29 | End: 2017-09-13

## 2017-08-29 NOTE — PATIENT INSTRUCTIONS
Cipro 500 mg   1 pill twice per day for 10 days    Take a probiotic for the next few weeks    Call to set up a urology visit

## 2017-08-29 NOTE — PROGRESS NOTES
SUBJECTIVE:   Gagan Herrera is a 36 year old male who presents to clinic today for the following health issues:      ED/UC Followup:    Facility:  [facility]  Date of visit: 08/21/2017  Reason for visit: Acute pyelonephritis  Current Status: Pt states it was feeling much better until this morning it feel like there is a little bit of a tingle when her urinates.      Initially was treated w/ Bactrim. Sx did not improve.  On repeat evaluation had persistent pyuria.   Treated w/ Rocephin IV then cephalexin.  UCx grew out E coli, R to Bactrim.  Sx improved, but now sx are returning over the past day. Tingling. No fever or chills. No pelvic pain.    Reviewed hx, had a proteus UTI in 2016.    Circumcised.  No prior hx of UTI other than these 2 episodes.       Problem list and histories reviewed & adjusted, as indicated.    Labs reviewed in EPIC    Reviewed and updated as needed this visit by clinical staff  Tobacco  Allergies  Meds  Med Hx  Surg Hx  Fam Hx  Soc Hx            OBJECTIVE:     /60 (Cuff Size: Adult Large)  Pulse 80  Temp 97.8  F (36.6  C) (Oral)  Wt 178 lb (80.7 kg)  SpO2 98%  BMI 26.67 kg/m2  Body mass index is 26.67 kg/(m^2).  GEN: no distress  SKIN: no rashes  ABD: Bowel sounds positive throughout. Soft, nontender, nondistended. No organomegaly. No masses.   EXTR: no edema    Results for orders placed or performed in visit on 08/29/17   *UA reflex to Microscopic and Culture ([place] and [facility] (except [place] and [place])   Result Value Ref Range    Color Urine Yellow     Appearance Urine Slightly Cloudy     Glucose Urine Negative NEG^Negative mg/dL    Bilirubin Urine Negative NEG^Negative    Ketones Urine Negative NEG^Negative mg/dL    Specific Gravity Urine 1.010 1.003 - 1.035    Blood Urine Negative NEG^Negative    pH Urine 6.5 5.0 - 7.0 pH    Protein Albumin Urine Negative NEG^Negative mg/dL    Urobilinogen Urine 0.2 0.2 - 1.0 EU/dL    Nitrite Urine Negative NEG^Negative     Leukocyte Esterase Urine Small (A) NEG^Negative    Source Midstream Urine    Urine Microscopic   Result Value Ref Range    WBC Urine 10-25 (A) OTO2^O - 2 /HPF    RBC Urine O - 2 OTO2^O - 2 /HPF    Bacteria Urine Moderate (A) NEG^Negative /HPF        ASSESSMENT/PLAN:       ICD-10-CM    1. Dysuria R30.0 *UA reflex to Microscopic and Culture ([place] and [facility] (except [place] and [place])     Urine Microscopic     ciprofloxacin (CIPRO) 500 MG tablet   2. Nonspecific finding on examination of urine R82.90 Urine Culture Aerobic Bacterial   3. Acute cystitis without hematuria N30.00 UROLOGY ADULT REFERRAL     Persistent elevated WBC in urine. No CVA tenderness today, more likely lower UTI vs possible prostatitis.  Due to repeat infections and persistent sx now, recommend urology evaluation.    Patient Instructions   Cipro 500 mg   1 pill twice per day for 10 days    Take a probiotic for the next few weeks    Call to set up a urology visit     Justin Wallace MD  [facility]

## 2017-08-29 NOTE — MR AVS SNAPSHOT
After Visit Summary   8/29/2017    Gagan Herrera    MRN: [id number]           Patient Information     Date Of Birth          1980        Visit Information        Provider Department      8/29/2017 2:20 PM Justin Wallace MD [facility]        Today's Diagnoses     Dysuria    -  1    Nonspecific finding on examination of urine        Acute cystitis without hematuria          Care Instructions    Cipro 500 mg   1 pill twice per day for 10 days    Take a probiotic for the next few weeks    Call to set up a urology visit           Follow-ups after your visit        Additional Services     UROLOGY ADULT REFERRAL       Your provider has referred you to:  [facility] [phone]   [email or web address]    Please be aware that coverage of these services is subject to the terms and limitations of your health insurance plan.  Call member services at your health plan with any benefit or coverage questions.      Please bring the following with you to your appointment:    (1) Any X-Rays, CTs or MRIs which have been performed.  Contact the facility where they were done to arrange for  prior to your scheduled appointment.    (2) List of current medications  (3) This referral request   (4) Any documents/labs given to you for this referral                  Who to contact     If you have questions or need follow up information about today's clinic visit or your schedule please contact [facility] directly at [phone].  Normal or non-critical lab and imaging results will be communicated to you by MyChart, letter or phone within 4 business days after the clinic has received the results. If you do not hear from us within 7 days, please contact the clinic through MyChart or phone. If you have a critical or abnormal lab result, we will notify you by phone as soon as possible.  Submit refill requests through [employer] or call your pharmacy and  "they will forward the refill request to us. Please allow 3 business days for your refill to be completed.          Additional Information About Your Visit        [employer] Information     [employer] lets you send messages to your doctor, view your test results, renew your prescriptions, schedule appointments and more. To sign up, go to [email or web address] . Click on \"Log in\" on the left side of the screen, which will take you to the Welcome page. Then click on \"Sign up Now\" on the right side of the page.     You will be asked to enter the access code listed below, as well as some personal information. Please follow the directions to create your username and password.     Your access code is: [email or web address]  Expires: 2017  1:13 PM     Your access code will  in 90 days. If you need help or a new code, please call your [place] clinic or [phone].        Care EveryWhere ID     This is your [facility] EveryWhere ID. This could be used by other organizations to access your [place] medical records  [id number]        Your Vitals Were     Pulse Temperature Pulse Oximetry BMI (Body Mass Index)          80 97.8  F (36.6  C) (Oral) 98% 26.67 kg/m2         Blood Pressure from Last 3 Encounters:   17 102/60   17 122/68   07/10/17 104/68    Weight from Last 3 Encounters:   17 178 lb (80.7 kg)   07/10/17 178 lb 1.6 oz (80.8 kg)   16 172 lb 8 oz (78.2 kg)              We Performed the Following     *UA reflex to Microscopic and Culture ([place] and [facility] (except [place] and [place])     Urine Culture Aerobic Bacterial     Urine Microscopic     UROLOGY ADULT REFERRAL          Today's Medication Changes          These changes are accurate as of: 17  3:04 PM.  If you have any questions, ask your nurse or doctor.               Start taking these medicines.        Dose/Directions    ciprofloxacin 500 MG tablet   Commonly known as:  CIPRO   Used for:  Dysuria   Started by:  Justin Wallace " MD MARC[facility]        Dose:  500 mg   Take 1 tablet (500 mg) by mouth 2 times daily   Quantity:  20 tablet   Refills:  0            Where to get your medicines      [facility] [place] - [address] AT [facility] [address]  [address] [address]     Phone:  [phone]     ciprofloxacin 500 MG tablet                Primary Care Provider Office Phone # Fax #    Cherie Zac Magana [phone]       [facility] [address] [place] [address]        Equal Access to Services     [facility]: Hadii aad ku hadasho Soomaali, waaxda luqadaha, qaybta kaalmada adeegyada, waxsantiago maganain hayree ellsi . So [facility] [phone].    ATENCIÓN: Si habla español, tiene a aly disposición servicios gratuitos de asistencia lingüística. [facility] [phone].    We comply with applicable federal civil rights laws and [place] laws. We do not discriminate on the basis of race, color, national origin, age, disability sex, sexual orientation or gender identity.            Thank you!     Thank you for choosing [facility]  for your care. Our goal is always to provide you with excellent care. Hearing back from our patients is one way we can continue to improve our services. Please take a few minutes to complete the written survey that you may receive in the mail after your visit with us. Thank you!             Your Updated Medication List - Protect others around you: Learn how to safely use, store and throw away your medicines at www.disposemymeds.org.          This list is accurate as of: 8/29/17  3:04 PM.  Always use your most recent med list.                   Brand Name Dispense Instructions for use Diagnosis    ciprofloxacin 500 MG tablet    CIPRO    20 tablet    Take 1 tablet (500 mg) by mouth 2 times daily    Dysuria       valACYclovir 500 MG tablet    VALTREX    90 tablet    Take 1 tablet (500 mg) by mouth daily    Recurrent cold  sores

## 2017-08-29 NOTE — TELEPHONE ENCOUNTER
Additional Information    Negative: Shock suspected (e.g., cold/pale/clammy skin, too weak to stand, low BP, rapid pulse)    Negative: Sounds like a life-threatening emergency to the triager    Negative: [1] Unable to urinate (or only a few drops) > 4 hours AND     [2] bladder feels very full (e.g., palpable bladder or strong urge to urinate)    Negative: [1] Decreased urination and [2] drinking very little AND [2] dehydration suspected (e.g., dark urine, no urine > 12 hours, very dry mouth, very lightheaded)    Negative: Patient sounds very sick or weak to the triager    Negative: Fever > 100.5 F (38.1 C)    Negative: Side (flank) or lower back pain present    Negative: [1] Can't control passage of urine (i.e., urinary incontinence) AND [2] new onset (< 2 weeks) or worsening    Negative: Urinating more frequently than usual (i.e., frequency)    Negative: Bad or foul-smelling urine    Negative: [1] Can't control passage of urine (i.e., urinary incontinence, wetting self) AND [2] present > 2 weeks    Negative: Urination is difficult to start (i.e., hesitancy) or straining    Negative: Dribbling (losing urine) just after finishing urination (i.e., post-void dribbling)    Negative: Has to get out of bed to urinate > 2 times a night (i.e., nocturia)    Negative: All other urine symptoms    Protocols used: URINARY SYMPTOMS-ADULT-  Patient is calling and states he completed his course of antibiotics. Patient states he continues to have some slight burning with urination, and would like for provider to extend his antibiotic therapy. Triager advised patient to call back when clinic open up to speak with provider's care team regarding symptoms and medication request.

## 2017-08-29 NOTE — TELEPHONE ENCOUNTER
"Patient was seen in the ER on 08/21 and diagnosed with Pyelonephritis.  Patient was on Cephalexin until Sunday, and symptoms had completely resolved.  Last night noted \"tingling\" with urination.  No fever.  No flank pain.  Concern that UTI is recurring.  Per ER discharge note patient was to be seen if ALL symptoms were not completely resolved in 5 days.  Patient didn't make Urology appointment this am.  States that when he contacted the Urology office he was told not to see them if he was having symptoms, and should contact primary for further evaluation and treatment.  Appointment advised and scheduled this afternoon.  JAZMINE Stovall RN    "

## 2017-08-29 NOTE — NURSING NOTE
"Chief Complaint   Patient presents with     ER F/U       Initial /60 (Cuff Size: Adult Large)  Pulse 80  Temp 97.8  F (36.6  C) (Oral)  Wt 178 lb (80.7 kg)  SpO2 98%  BMI 26.67 kg/m2 Estimated body mass index is 26.67 kg/(m^2) as calculated from the following:    Height as of 7/10/17: 5' 8.5\" (1.74 m).    Weight as of this encounter: 178 lb (80.7 kg).  Medication Reconciliation: complete    Viki Chino   "

## 2017-08-29 NOTE — LETTER
[facility]  [address] [address]                  [phone]   August 31, 2017    Gagan Herrera  [address] [address]          Gagan:    Your urine culture results are complete. The culture shows that you still have E coli in your urinary tract.     The sensitivity is the same as with the prior culture, indicating that your infection did not fully clear.    If your symptoms do not resolve with the Cipro, please let me know.       Please also feel free to contact me if you have any questions or concerns.      Justin Wallace MD      Results for orders placed or performed in visit on 08/29/17   *UA reflex to Microscopic and Culture ([place] and [facility] (except [place] and [place])   Result Value Ref Range    Color Urine Yellow     Appearance Urine Slightly Cloudy     Glucose Urine Negative NEG^Negative mg/dL    Bilirubin Urine Negative NEG^Negative    Ketones Urine Negative NEG^Negative mg/dL    Specific Gravity Urine 1.010 1.003 - 1.035    Blood Urine Negative NEG^Negative    pH Urine 6.5 5.0 - 7.0 pH    Protein Albumin Urine Negative NEG^Negative mg/dL    Urobilinogen Urine 0.2 0.2 - 1.0 EU/dL    Nitrite Urine Negative NEG^Negative    Leukocyte Esterase Urine Small (A) NEG^Negative    Source Midstream Urine    Urine Microscopic   Result Value Ref Range    WBC Urine 10-25 (A) OTO2^O - 2 /HPF    RBC Urine O - 2 OTO2^O - 2 /HPF    Bacteria Urine Moderate (A) NEG^Negative /HPF   Urine Culture Aerobic Bacterial   Result Value Ref Range    Specimen Description Midstream Urine     Culture Micro >100,000 colonies/mL  Escherichia coli   (A)        Susceptibility    Escherichia coli - MINDY     AMPICILLIN >=32 Resistant ug/mL     CEFAZOLIN* <=4 Sensitive ug/mL      * Cefazolin MINDY breakpoints are for the treatment of uncomplicated urinary tract infections.  For the treatment of systemic infections, please contact the laboratory for additional  testing.     CEFOXITIN <=4 Sensitive ug/mL     CEFTAZIDIME <=1 Sensitive ug/mL     CEFTRIAXONE <=1 Sensitive ug/mL     CIPROFLOXACIN <=0.25 Sensitive ug/mL     GENTAMICIN <=1 Sensitive ug/mL     LEVOFLOXACIN <=0.12 Sensitive ug/mL     NITROFURANTOIN <=16 Sensitive ug/mL     TOBRAMYCIN <=1 Sensitive ug/mL     Trimethoprim/Sulfa >=16/304 Resistant ug/mL     AMPICILLIN/SULBACTAM 16 Intermediate ug/mL     Piperacillin/Tazo <=4 Sensitive ug/mL     CEFEPIME <=1 Sensitive ug/mL

## 2017-08-31 LAB
BACTERIA SPEC CULT: ABNORMAL
SPECIMEN SOURCE: ABNORMAL

## 2017-09-08 ENCOUNTER — OFFICE VISIT (OUTPATIENT)
Dept: PEDIATRICS | Facility: CLINIC | Age: 37
End: 2017-09-08
Payer: COMMERCIAL

## 2017-09-08 VITALS
SYSTOLIC BLOOD PRESSURE: 106 MMHG | WEIGHT: 175.3 LBS | DIASTOLIC BLOOD PRESSURE: 64 MMHG | HEIGHT: 69 IN | HEART RATE: 75 BPM | OXYGEN SATURATION: 98 % | BODY MASS INDEX: 25.96 KG/M2 | TEMPERATURE: 97 F

## 2017-09-08 DIAGNOSIS — F32.1 MODERATE MAJOR DEPRESSION (H): ICD-10-CM

## 2017-09-08 DIAGNOSIS — R30.0 DYSURIA: Primary | ICD-10-CM

## 2017-09-08 DIAGNOSIS — F41.9 ANXIETY: ICD-10-CM

## 2017-09-08 LAB
ALBUMIN UR-MCNC: NEGATIVE MG/DL
APPEARANCE UR: CLEAR
BILIRUB UR QL STRIP: NEGATIVE
COLOR UR AUTO: YELLOW
GLUCOSE UR STRIP-MCNC: NEGATIVE MG/DL
HGB UR QL STRIP: NEGATIVE
KETONES UR STRIP-MCNC: NEGATIVE MG/DL
LEUKOCYTE ESTERASE UR QL STRIP: ABNORMAL
NITRATE UR QL: NEGATIVE
PH UR STRIP: 5.5 PH (ref 5–7)
RBC #/AREA URNS AUTO: NORMAL /HPF
SOURCE: ABNORMAL
SP GR UR STRIP: 1.01 (ref 1–1.03)
UROBILINOGEN UR STRIP-ACNC: 0.2 EU/DL (ref 0.2–1)
WBC #/AREA URNS AUTO: NORMAL /HPF

## 2017-09-08 PROCEDURE — 81001 URINALYSIS AUTO W/SCOPE: CPT | Performed by: INTERNAL MEDICINE

## 2017-09-08 PROCEDURE — 99213 OFFICE O/P EST LOW 20 MIN: CPT | Performed by: INTERNAL MEDICINE

## 2017-09-08 NOTE — PROGRESS NOTES
SUBJECTIVE:   Gagan Herrera is a 36 year old male who presents to clinic today for the following health issues:    Genitourinary symptoms      Duration: 3 weeks    Description:  retention    Intensity:  mild    Accompanying signs and symptoms (fever/discharge/nausea/vomiting/back or abdominal pain):  Kidney and low back pain    History (frequent UTI's/kidney stones/prostate problems): has urology appointment next week  Sexually active: YES    Precipitating or alleviating factors: None    Therapies tried and outcome: course of antibiotics - has been on 3 different abx took last Cipro this am and increase fluid intake   Outcome: still having mild symptoms    Symptoms started 3 weeks ago. Seen in UR and initially prescribed bactrim. Had no improvement in symptoms. Went to ER at [facility] - given ceftriaxone and switch to oral form. Took for 7 days. About 1.5 days after stoppped, started to have some dysuria. Saw Dr. Wallace. Was prescribed cipro. Finished last dose today. Has an appointment with Urology next Tuesday morning. Still has some pressure over lower back and some suprapubic tenderness. After urinates, still feels like could go again right after. Has been drinking a lot of fluids. Changed job at work and now is sitting more and using a head set and has had to hold urine for an hour or more after had signal that he had to go.     Reviewed and updated as needed this visit by clinical staffTobacco  Allergies  Meds  Problems  Med Hx  Surg Hx  Fam Hx  Soc Hx        Reviewed and updated as needed this visit by Provider  Allergies  Meds  Problems       -------------------------------------    Problem list and histories reviewed & adjusted, as indicated.  Additional history: as documented    ROS:  Constitutional, HEENT, cardiovascular, pulmonary, gi and gu systems are negative, except as otherwise noted.      Problem list, Medication list, Allergies, and Medical/Social/Surgical histories reviewed in [facility]  "and updated as appropriate.    OBJECTIVE:                                                    /64 (BP Location: Right arm, Patient Position: Chair, Cuff Size: Adult Large)  Pulse 75  Temp 97  F (36.1  C) (Tympanic)  Ht 5' 8.5\" (1.74 m)  Wt 175 lb 4.8 oz (79.5 kg)  SpO2 98%  BMI 26.27 kg/m2   Body mass index is 26.27 kg/(m^2).  General Appearance: healthy, alert and no distress  Eyes:   no discharge, erythema.  Normal pupils.  Abdomen: soft, mild suprapubic tenderness, no hepatosplenomegaly or masses, and bowel sounds normal  Skin: no rashes or lesions.  Well perfused and normal turgor.    Diagnostic Test Results:  Results for orders placed or performed in visit on 09/08/17   *UA reflex to Microscopic and Culture ([place] and [facility] (except [place] and [place])   Result Value Ref Range    Color Urine Yellow     Appearance Urine Clear     Glucose Urine Negative NEG^Negative mg/dL    Bilirubin Urine Negative NEG^Negative    Ketones Urine Negative NEG^Negative mg/dL    Specific Gravity Urine 1.010 1.003 - 1.035    Blood Urine Negative NEG^Negative    pH Urine 5.5 5.0 - 7.0 pH    Protein Albumin Urine Negative NEG^Negative mg/dL    Urobilinogen Urine 0.2 0.2 - 1.0 EU/dL    Nitrite Urine Negative NEG^Negative    Leukocyte Esterase Urine Trace (A) NEG^Negative    Source Midstream Urine    Urine Microscopic   Result Value Ref Range    WBC Urine O - 2 OTO2^O - 2 /HPF    RBC Urine O - 2 OTO2^O - 2 /HPF        ASSESSMENT/PLAN:                                                      (R30.0) Dysuria  (primary encounter diagnosis)  Comment: UA now clear. Reviewed UCx and previous UA's with patient. UA much improved after course of cephalexin, but still with 25-50 WBC. Both Ucx grew E. Coli that was resistant to bactrim.   Plan: *UA reflex to Microscopic and Culture ([place]         [facility] ([facility] and         [place]), Urine Microscopic  - infection appears clear at this point  - " encouraged to follow-up with Urology next week  - could be having elevated post-void residuals after not voiding until long after body signaled he should    (F32.1) Moderate major depression (H)  (F41.9) Anxiety  Comment: has been controlled off medication  Plan: continue to monitor    Follow up with Provider - annual visit and as needed     Cherie Frank [facility] ISAIAH

## 2017-09-08 NOTE — NURSING NOTE
"Chief Complaint   Patient presents with     Urinary Problem       Initial /64 (BP Location: Right arm, Patient Position: Chair, Cuff Size: Adult Large)  Pulse 75  Temp 97  F (36.1  C) (Tympanic)  Ht 5' 8.5\" (1.74 m)  Wt 175 lb 4.8 oz (79.5 kg)  SpO2 98%  BMI 26.27 kg/m2 Estimated body mass index is 26.27 kg/(m^2) as calculated from the following:    Height as of this encounter: 5' 8.5\" (1.74 m).    Weight as of this encounter: 175 lb 4.8 oz (79.5 kg).  Medication Reconciliation: complete   Meena Jo LPN      "

## 2017-09-09 PROBLEM — F41.9 ANXIETY: Status: ACTIVE | Noted: 2017-09-09

## 2017-09-12 DIAGNOSIS — R33.9 URINARY RETENTION WITH INCOMPLETE BLADDER EMPTYING: Primary | ICD-10-CM

## 2017-09-12 DIAGNOSIS — R10.9 FLANK PAIN: ICD-10-CM

## 2017-09-13 ENCOUNTER — OFFICE VISIT (OUTPATIENT)
Dept: UROLOGY | Facility: CLINIC | Age: 37
End: 2017-09-13
Payer: COMMERCIAL

## 2017-09-13 VITALS
HEIGHT: 69 IN | OXYGEN SATURATION: 98 % | HEART RATE: 82 BPM | WEIGHT: 175 LBS | DIASTOLIC BLOOD PRESSURE: 60 MMHG | BODY MASS INDEX: 25.92 KG/M2 | SYSTOLIC BLOOD PRESSURE: 120 MMHG

## 2017-09-13 DIAGNOSIS — N39.0 RECURRENT UTI: Primary | ICD-10-CM

## 2017-09-13 DIAGNOSIS — N97.9 FEMALE INFERTILITY: ICD-10-CM

## 2017-09-13 LAB
ALBUMIN UR-MCNC: NEGATIVE MG/DL
APPEARANCE UR: CLEAR
BILIRUB UR QL STRIP: NEGATIVE
COLOR UR AUTO: YELLOW
GLUCOSE UR STRIP-MCNC: NEGATIVE MG/DL
HGB UR QL STRIP: NEGATIVE
KETONES UR STRIP-MCNC: NEGATIVE MG/DL
LEUKOCYTE ESTERASE UR QL STRIP: NEGATIVE
NITRATE UR QL: NEGATIVE
PH UR STRIP: 6.5 PH (ref 5–7)
SOURCE: NORMAL
SP GR UR STRIP: 1.01 (ref 1–1.03)
UROBILINOGEN UR STRIP-ACNC: 0.2 EU/DL (ref 0.2–1)

## 2017-09-13 PROCEDURE — 99202 OFFICE O/P NEW SF 15 MIN: CPT | Performed by: UROLOGY

## 2017-09-13 PROCEDURE — 81003 URINALYSIS AUTO W/O SCOPE: CPT | Performed by: UROLOGY

## 2017-09-13 ASSESSMENT — PAIN SCALES - GENERAL: PAINLEVEL: NO PAIN (0)

## 2017-09-13 NOTE — MR AVS SNAPSHOT
After Visit Summary   9/13/2017    Gagan Herrera    MRN: [id number]           Patient Information     Date Of Birth          1980        Visit Information        Provider Department      9/13/2017 10:00 AM Javier Poon MD [facility]rology [facility]        Today's Diagnoses     Recurrent UTI    -  1    Female infertility           Follow-ups after your visit        Your next 10 appointments already scheduled     Sep 18, 2017  8:00 AM CDT   CT ABDOMEN PELVIS W/O & W CONTRAST with SHCT1   [facility] ([facility])    [address] [place] [id number] [phone]           Please bring any scans or X-rays taken at other hospitals, if similar tests were done. Also bring a list of your medicines, including vitamins, minerals and over-the-counter drugs. It is safest to leave personal items at home.  Be sure to tell your doctor:   If you have any allergies.   If there s any chance you are pregnant.   If you are breastfeeding.   If you have any special needs.  You may have contrast for this exam. To prepare:   Do not eat or drink for 2 hours before your exam. If you need to take medicine, you may take it with small sips of water. (We may ask you to take liquid medicine as well.)   The day before your exam, drink extra fluids at least six 8-ounce glasses (unless your doctor tells you to restrict your fluids).  Patients over 70 or patients with diabetes or kidney problems:   If you haven t had a blood test (creatinine test) within the last 30 days, go to your clinic or Diagnostic Imaging Department for this test.  If you have diabetes:   If your kidney function is normal, continue taking your metformin (Avandamet, Glucophage, Glucovance, Metaglip) on the day of your exam.   If your kidney function is abnormal, wait 48 hours before restarting this medicine.  You will have oral contrast for this exam:   You will drink the contrast at  home. Get this from your clinic or Diagnostic Imaging Department. Please follow the directions given.  Please wear loose clothing, such as a sweat suit or jogging clothes. Avoid snaps, zippers and other metal. We may ask you to undress and put on a hospital gown.  If you have any questions, please call the Imaging Department where you will have your exam.            Sep 29, 2017  3:00 PM CDT   Cystoscopy with Javier Poon MD, UA CYF   [facility] [place] ([facility])    [address]  [address]  [facility] [address]   [phone]              Future tests that were ordered for you today     Open Future Orders        Priority Expected Expires Ordered    CT Abdomen Pelvis w/o & w Contrast Routine  9/13/2018 9/13/2017    Semen Analysis, Complete ([facility]) Routine  10/31/2017 9/13/2017    UA without Microscopic [[id number]] Routine  9/12/2018 9/12/2017    MEASURE POST-VOID RESIDUAL URINE/BLADDER CAPACITY, US NON-IMAGING (33749) Routine  9/12/2018 9/12/2017            Who to contact     If you have questions or need follow up information about today's clinic visit or your schedule please contact [facility]OLOGY [facility] directly at [phone].  Normal or non-critical lab and imaging results will be communicated to you by Sport/Lifehart, letter or phone within 4 business days after the clinic has received the results. If you do not hear from us within 7 days, please contact the clinic through MyChart or phone. If you have a critical or abnormal lab result, we will notify you by phone as soon as possible.  Submit refill requests through [email or web address] or call your pharmacy and they will forward the refill request to us. Please allow 3 business days for your refill to be completed.          Additional Information About Your Visit        [email or web address] Information     [email or web address] lets you send messages to your doctor, view your test results, renew your prescriptions,  "schedule appointments and more. To sign up, go to [email or web address] . Click on \"Log in\" on the left side of the screen, which will take you to the Welcome page. Then click on \"Sign up Now\" on the right side of the page.     You will be asked to enter the access code listed below, as well as some personal information. Please follow the directions to create your username and password.     Your access code is: [email or web address]  Expires: 2017  1:13 PM     Your access code will  in 90 days. If you need help or a new code, please call your [place] clinic or [phone].        Care EveryWhere ID     This is your Care EveryWhere ID. This could be used by other organizations to access your [place] medical records  [id number]        Your Vitals Were     Pulse Height Pulse Oximetry BMI (Body Mass Index)          82 1.753 m (5' 9\") 98% 25.84 kg/m2         Blood Pressure from Last 3 Encounters:   17 120/60   17 106/64   17 102/60    Weight from Last 3 Encounters:   17 79.4 kg (175 lb)   17 79.5 kg (175 lb 4.8 oz)   17 80.7 kg (178 lb)              We Performed the Following     UA without Microscopic        Primary Care Provider Office Phone # Fax #    Cherie Magana [phone] [address] [place] [address]        Equal Access to Services     [facility] AH: Hadii luis ku hadasho Soomaali, waaxda luqadaha, qaybta kaalmada adeegyada, melissa connors. So [facility] [phone].    ATENCIÓN: Si habla español, tiene a aly disposición servicios gratuitos de asistencia lingüística. Llame al [phone].    We comply with applicable federal civil rights laws and [place] laws. We do not discriminate on the basis of race, color, national origin, age, disability sex, sexual orientation or gender identity.            Thank you!     Thank you for choosing [facility] [place]  for your " care. Our goal is always to provide you with excellent care. Hearing back from our patients is one way we can continue to improve our services. Please take a few minutes to complete the written survey that you may receive in the mail after your visit with us. Thank you!             Your Updated Medication List - Protect others around you: Learn how to safely use, store and throw away your medicines at www.disposemymeds.org.          This list is accurate as of: 9/13/17 10:47 AM.  Always use your most recent med list.                   Brand Name Dispense Instructions for use Diagnosis    valACYclovir 500 MG tablet    VALTREX    90 tablet    Take 1 tablet (500 mg) by mouth daily    Recurrent cold sores

## 2017-09-13 NOTE — LETTER
9/13/2017       RE: Gagan Herrera  [address] [place] [id number]     Dear Colleague,    Thank you for referring your patient, Gagan Herrera, to the [facility]OLOG[facility] [place] [facility]. Please see a copy of my visit note below.    Gagan Herrera is a 36-year-old male with recurrent bacterial cystitis. He was initially treated with a sulfa trimethoprim that was resistant; he has recently completed a ten-day course of Cipro and feels much improved. Renal ultrasound was unremarkable.  He is having no difficulty voiding, hematuria or dysuria. While on Cipro he did have some discomfort with ejaculation, none prior. He denies fever, chills, back pain.  Other past medical history: Former smoker  Family history: Adopted  Medications: Valtrex  Allergies: None  Review of systems: As above, patient and wife have been trying to conceive for just over a year  Exam: Normal appearance, normal vital signs, alert and oriented, normocephalic, normal respirations, neuro grossly intact  Urinalysis: Normal  Assessment: Escherichia coli cystitis-could be the result of bacterial prostatitis. Needs evaluation with CT scan, office cystoscopy, AISHA.   Plan:. CT scan with contrast, AISHA, office cystoscopy on return. Semen analysis. If he redevelops symptoms will do bacterial semen culture to determine source. Patient may require several weeks of antibiotic therapy-will not extend his Cipro at this time because he did have some GI distress and shoulder discomfort with the drug.    Again, thank you for allowing me to participate in the care of your patient.      Sincerely,    Javier Poon MD

## 2017-09-13 NOTE — PROGRESS NOTES
Gagan Herrera is a 36-year-old male with recurrent bacterial cystitis. He was initially treated with a sulfa trimethoprim that was resistant; he has recently completed a ten-day course of Cipro and feels much improved. Renal ultrasound was unremarkable.  He is having no difficulty voiding, hematuria or dysuria. While on Cipro he did have some discomfort with ejaculation, none prior. He denies fever, chills, back pain.  Other past medical history: Former smoker  Family history: Adopted  Medications: Valtrex  Allergies: None  Review of systems: As above, patient and wife have been trying to conceive for just over a year  Exam: Normal appearance, normal vital signs, alert and oriented, normocephalic, normal respirations, neuro grossly intact  Urinalysis: Normal  Assessment: Escherichia coli cystitis-could be the result of bacterial prostatitis. Needs evaluation with CT scan, office cystoscopy, AISHA.   Plan:. CT scan with contrast, AISHA, office cystoscopy on return. Semen analysis. If he redevelops symptoms will do bacterial semen culture to determine source. Patient may require several weeks of antibiotic therapy-will not extend his Cipro at this time because he did have some GI distress and shoulder discomfort with the drug.

## 2017-09-19 DIAGNOSIS — R30.0 DYSURIA: Primary | ICD-10-CM

## 2017-09-19 DIAGNOSIS — N97.9 FEMALE INFERTILITY: ICD-10-CM

## 2017-09-19 PROCEDURE — 87070 CULTURE OTHR SPECIMN AEROBIC: CPT | Performed by: UROLOGY

## 2017-09-19 PROCEDURE — 87077 CULTURE AEROBIC IDENTIFY: CPT | Performed by: UROLOGY

## 2017-09-19 PROCEDURE — 87186 SC STD MICRODIL/AGAR DIL: CPT | Performed by: UROLOGY

## 2017-09-22 DIAGNOSIS — N41.9 PROSTATITIS: Primary | ICD-10-CM

## 2017-09-22 LAB
BACTERIA SPEC CULT: ABNORMAL
SPECIMEN SOURCE: ABNORMAL

## 2017-09-29 ENCOUNTER — OFFICE VISIT (OUTPATIENT)
Dept: UROLOGY | Facility: CLINIC | Age: 37
End: 2017-09-29
Payer: COMMERCIAL

## 2017-09-29 VITALS
SYSTOLIC BLOOD PRESSURE: 110 MMHG | DIASTOLIC BLOOD PRESSURE: 68 MMHG | HEIGHT: 69 IN | HEART RATE: 76 BPM | WEIGHT: 175 LBS | BODY MASS INDEX: 25.92 KG/M2

## 2017-09-29 DIAGNOSIS — N41.8 BACTERIAL PROSTATITIS: ICD-10-CM

## 2017-09-29 DIAGNOSIS — N41.8 BACTERIAL PROSTATITIS: Primary | ICD-10-CM

## 2017-09-29 DIAGNOSIS — B96.89 BACTERIAL PROSTATITIS: Primary | ICD-10-CM

## 2017-09-29 DIAGNOSIS — B96.89 BACTERIAL PROSTATITIS: ICD-10-CM

## 2017-09-29 PROCEDURE — 52000 CYSTOURETHROSCOPY: CPT | Performed by: UROLOGY

## 2017-09-29 PROCEDURE — 81003 URINALYSIS AUTO W/O SCOPE: CPT | Performed by: UROLOGY

## 2017-09-29 ASSESSMENT — PAIN SCALES - GENERAL: PAINLEVEL: NO PAIN (0)

## 2017-09-29 NOTE — NURSING NOTE
Chief Complaint   Patient presents with     Cystoscopy     Reocurring UTI's   Prior to the start of the procedure and with procedural staff participation, I verbally confirmed the patient s identity using two indicators, relevant allergies, that the procedure was appropriate and matched the consent or emergent situation, and that the correct equipment/implants were available. Immediately prior to starting the procedure I conducted the Time Out with the procedural staff and re-confirmed the patient s name, procedure, and site/side. (The Joint Commission universal protocol was followed.)  Yes    Sedation (Moderate or Deep): None    Patty Frank LPN

## 2017-09-29 NOTE — PROGRESS NOTES
Gagan is a 37-year-old male who has had recurrent Escherichia coli cystitis and suspected bacterial prostatitis. Renal ultrasound is normal. Semen culture recently grew enterococcus and he is on Augmentin 875 mg twice daily now for the last 4-5 days. Urinalysis today is normal  Flexible cystoscopy-normal urethra, open prostatic fossa, bladder mucosa normal, no trabeculation, normal ureteral orifices  Assessment: Bacterial prostatitis-culturing out enterococcus presently, probably is the source of his Escherichia coli as well  Plan: Complete Augmentin course. Discussed chronic bacterial prostatitis. If recurrent symptoms in the future, semen culture first to guide treatment

## 2017-09-29 NOTE — MR AVS SNAPSHOT
"              After Visit Summary   9/29/2017    Gagan Herrera    MRN: [id number]           Patient Information     Date Of Birth          1980        Visit Information        Provider Department      9/29/2017 3:00 PM Javier Poon MD; [facility] [place]        Today's Diagnoses     Bacterial prostatitis          Care Instructions         AFTER YOUR CYSTOSCOPY         You have just completed a cystoscopy, or \"cysto\", which allowed your physician to learn more about your bladder (or to remove a stent placed after surgery). We suggest that you continue to avoid caffeine, fruit juice, and alcohol for the next 24 hours, however, you are encouraged to return to your normal activities.       A few things that are considered normal after your cystoscopy:    * small amount of bleeding (or spotting) that clears within the next 24 hours    * slight burning sensation with urination    * sensation to of needing to avoid more frequently    * the feeling of \"air\" in your urine    * mild discomfort that is relieved with Tylonol        Please contact our office promptly if you:    * develop a fever above 101 degrees    * are unable to urinate    * develop bright red blood that does not stop    * severe pain or swelling        And of course, please contact our office with any concerns or questions [phone]              Follow-ups after your visit        Who to contact     If you have questions or need follow up information about today's clinic visit or your schedule please contact [facility] [place] directly at [phone].  Normal or non-critical lab and imaging results will be communicated to you by MyChart, letter or phone within 4 business days after the clinic has received the results. If you do not hear from us within 7 days, please contact the clinic through MyChart or phone. If you have a critical or abnormal lab result, we will notify " "you by phone as soon as possible.  Submit refill requests through [employer] or call your pharmacy and they will forward the refill request to us. Please allow 3 business days for your refill to be completed.          Additional Information About Your Visit        [employer] Information     [employer] lets you send messages to your doctor, view your test results, renew your prescriptions, schedule appointments and more. To sign up, go to [email or web address] . Click on \"Log in\" on the left side of the screen, which will take you to the Welcome page. Then click on \"Sign up Now\" on the right side of the page.     You will be asked to enter the access code listed below, as well as some personal information. Please follow the directions to create your username and password.     Your access code is: [email or web address]  Expires: 2017  3:42 PM     Your access code will  in 90 days. If you need help or a new code, please call your [place] clinic or [phone].        Care EveryWhere ID     This is your Care EveryWhere ID. This could be used by other organizations to access your [facility] medical records  [id number]        Your Vitals Were     Pulse Height BMI (Body Mass Index)             76 1.753 m (5' 9\") 25.84 kg/m2          Blood Pressure from Last 3 Encounters:   17 110/68   17 120/60   17 106/64    Weight from Last 3 Encounters:   17 79.4 kg (175 lb)   17 79.4 kg (175 lb)   17 79.5 kg (175 lb 4.8 oz)              We Performed the Following     CYSTOURETHROSCOPY (18183)     UA without Microscopic        Primary Care Provider Office Phone # Fax #    Cherie Magana [phone] [address] [place] [id number]        Equal Access to Services     LUIS GARY : Wiley Perez, ely deleon, qamelissa blake. [facility] [phone].    ATENCIÓN: Si cat wiggins " disposición servicios gratuitos de asistencia lingüística. Therese mora [phone].    We comply with applicable federal civil rights laws and [place] laws. We do not discriminate on the basis of race, color, national origin, age, disability, sex, sexual orientation, or gender identity.            Thank you!     Thank you for choosing [facility] [place]  for your care. Our goal is always to provide you with excellent care. Hearing back from our patients is one way we can continue to improve our services. Please take a few minutes to complete the written survey that you may receive in the mail after your visit with us. Thank you!             Your Updated Medication List - Protect others around you: Learn how to safely use, store and throw away your medicines at www.disposemymeds.org.          This list is accurate as of: 9/29/17  3:42 PM.  Always use your most recent med list.                   Brand Name Dispense Instructions for use Diagnosis    amoxicillin-clavulanate 875-125 MG per tablet    AUGMENTIN    40 tablet    Take 1 tablet by mouth 2 times daily and see MD when treatment is complete.    Prostatitis       valACYclovir 500 MG tablet    VALTREX    90 tablet    Take 1 tablet (500 mg) by mouth daily    Recurrent cold sores

## 2017-09-29 NOTE — LETTER
9/29/2017       RE: Gagan Herrera  [address] [facility] [address]     Dear Colleague,    Thank you for referring your patient, Gagan Herrera, to the [facility] [place] at [facility]. Please see a copy of my visit note below.    Gagan is a 37-year-old male who has had recurrent Escherichia coli cystitis and suspected bacterial prostatitis. Renal ultrasound is normal. Semen culture recently grew enterococcus and he is on Augmentin 875 mg twice daily now for the last 4-5 days. Urinalysis today is normal  Flexible cystoscopy-normal urethra, open prostatic fossa, bladder mucosa normal, no trabeculation, normal ureteral orifices  Assessment: Bacterial prostatitis-culturing out enterococcus presently, probably is the source of his Escherichia coli as well  Plan: Complete Augmentin course. Discussed chronic bacterial prostatitis. If recurrent symptoms in the future, semen culture first to guide treatment    Again, thank you for allowing me to participate in the care of your patient.      Sincerely,    Javier Poon MD

## 2017-09-29 NOTE — PATIENT INSTRUCTIONS
"     AFTER YOUR CYSTOSCOPY         You have just completed a cystoscopy, or \"cysto\", which allowed your physician to learn more about your bladder (or to remove a stent placed after surgery). We suggest that you continue to avoid caffeine, fruit juice, and alcohol for the next 24 hours, however, you are encouraged to return to your normal activities.       A few things that are considered normal after your cystoscopy:    * small amount of bleeding (or spotting) that clears within the next 24 hours    * slight burning sensation with urination    * sensation to of needing to avoid more frequently    * the feeling of \"air\" in your urine    * mild discomfort that is relieved with Tylonol        Please contact our office promptly if you:    * develop a fever above 101 degrees    * are unable to urinate    * develop bright red blood that does not stop    * severe pain or swelling        And of course, please contact our office with any concerns or questions [phone]      "

## 2017-10-13 ENCOUNTER — TELEPHONE (OUTPATIENT)
Dept: UROLOGY | Facility: CLINIC | Age: 37
End: 2017-10-13

## 2017-10-13 NOTE — TELEPHONE ENCOUNTER
Patient called and LM on nurse line,  Returned phone call, LM.  Will wait for return phone call.     Patty Frank LPN

## 2017-10-17 NOTE — TELEPHONE ENCOUNTER
Called patient per MD, patient to drop-off semen sample for culture. Patient will stop at [facility] office to pick-up sample cup tomorrow. Patty Frank LPN

## 2017-10-17 NOTE — TELEPHONE ENCOUNTER
Pt is starting symptoms again after finishing his ABx treatment four days ago.  They have not been using ibuprofen. What I have recommended is that the pt use ibuprofen, take sitz baths, and avoid caffeine, fruit juice, and alcohol. They are requesting your feedback. What else can you recommend?

## 2017-10-18 DIAGNOSIS — B96.89 BACTERIAL PROSTATITIS: Primary | ICD-10-CM

## 2017-10-18 DIAGNOSIS — N41.8 BACTERIAL PROSTATITIS: Primary | ICD-10-CM

## 2017-10-18 PROCEDURE — 87077 CULTURE AEROBIC IDENTIFY: CPT | Performed by: UROLOGY

## 2017-10-18 PROCEDURE — 87186 SC STD MICRODIL/AGAR DIL: CPT | Mod: 59 | Performed by: UROLOGY

## 2017-10-18 PROCEDURE — 87070 CULTURE OTHR SPECIMN AEROBIC: CPT | Performed by: UROLOGY

## 2017-10-20 NOTE — PROGRESS NOTES
Called patient late in the day on Friday. Unable to leave message on (1) option. Will try to call patient again. Patty Frank LPN

## 2017-10-21 LAB
BACTERIA SPEC CULT: ABNORMAL
SPECIMEN SOURCE: ABNORMAL

## 2018-01-25 ENCOUNTER — OFFICE VISIT (OUTPATIENT)
Dept: PEDIATRICS | Facility: CLINIC | Age: 38
End: 2018-01-25
Payer: COMMERCIAL

## 2018-01-25 VITALS
WEIGHT: 175.6 LBS | BODY MASS INDEX: 26.01 KG/M2 | TEMPERATURE: 96.4 F | HEIGHT: 69 IN | OXYGEN SATURATION: 97 % | HEART RATE: 72 BPM | DIASTOLIC BLOOD PRESSURE: 62 MMHG | SYSTOLIC BLOOD PRESSURE: 96 MMHG

## 2018-01-25 DIAGNOSIS — F41.9 ANXIETY: ICD-10-CM

## 2018-01-25 DIAGNOSIS — Z31.69 INFERTILITY COUNSELING: ICD-10-CM

## 2018-01-25 DIAGNOSIS — Z13.220 SCREENING CHOLESTEROL LEVEL: ICD-10-CM

## 2018-01-25 DIAGNOSIS — F32.1 MODERATE MAJOR DEPRESSION (H): ICD-10-CM

## 2018-01-25 DIAGNOSIS — Z00.00 ROUTINE GENERAL MEDICAL EXAMINATION AT A HEALTH CARE FACILITY: Primary | ICD-10-CM

## 2018-01-25 DIAGNOSIS — L20.82 FLEXURAL ECZEMA: ICD-10-CM

## 2018-01-25 DIAGNOSIS — Z23 NEED FOR TDAP VACCINATION: ICD-10-CM

## 2018-01-25 DIAGNOSIS — Z13.1 SCREENING FOR DIABETES MELLITUS: ICD-10-CM

## 2018-01-25 LAB
ALBUMIN SERPL-MCNC: 4.4 G/DL (ref 3.4–5)
ALP SERPL-CCNC: 57 U/L (ref 40–150)
ALT SERPL W P-5'-P-CCNC: 22 U/L (ref 0–70)
ANION GAP SERPL CALCULATED.3IONS-SCNC: 3 MMOL/L (ref 3–14)
AST SERPL W P-5'-P-CCNC: 16 U/L (ref 0–45)
BILIRUB SERPL-MCNC: 0.8 MG/DL (ref 0.2–1.3)
BUN SERPL-MCNC: 14 MG/DL (ref 7–30)
CALCIUM SERPL-MCNC: 8.9 MG/DL (ref 8.5–10.1)
CHLORIDE SERPL-SCNC: 107 MMOL/L (ref 94–109)
CHOLEST SERPL-MCNC: 205 MG/DL
CO2 SERPL-SCNC: 30 MMOL/L (ref 20–32)
CREAT SERPL-MCNC: 1.03 MG/DL (ref 0.66–1.25)
GFR SERPL CREATININE-BSD FRML MDRD: 80 ML/MIN/1.7M2
GLUCOSE SERPL-MCNC: 85 MG/DL (ref 70–99)
HDLC SERPL-MCNC: 52 MG/DL
LDLC SERPL CALC-MCNC: 131 MG/DL
NONHDLC SERPL-MCNC: 153 MG/DL
POTASSIUM SERPL-SCNC: 4.4 MMOL/L (ref 3.4–5.3)
PROT SERPL-MCNC: 7.5 G/DL (ref 6.8–8.8)
SODIUM SERPL-SCNC: 140 MMOL/L (ref 133–144)
TRIGL SERPL-MCNC: 111 MG/DL

## 2018-01-25 PROCEDURE — 36415 COLL VENOUS BLD VENIPUNCTURE: CPT | Performed by: INTERNAL MEDICINE

## 2018-01-25 PROCEDURE — 90715 TDAP VACCINE 7 YRS/> IM: CPT | Performed by: INTERNAL MEDICINE

## 2018-01-25 PROCEDURE — 99395 PREV VISIT EST AGE 18-39: CPT | Performed by: INTERNAL MEDICINE

## 2018-01-25 PROCEDURE — 80053 COMPREHEN METABOLIC PANEL: CPT | Performed by: INTERNAL MEDICINE

## 2018-01-25 PROCEDURE — 80061 LIPID PANEL: CPT | Performed by: INTERNAL MEDICINE

## 2018-01-25 RX ORDER — MOMETASONE FUROATE 1 MG/G
CREAM TOPICAL DAILY PRN
Qty: 15 G | Refills: 1 | Status: SHIPPED | OUTPATIENT
Start: 2018-01-25 | End: 2020-02-26

## 2018-01-25 ASSESSMENT — ANXIETY QUESTIONNAIRES
7. FEELING AFRAID AS IF SOMETHING AWFUL MIGHT HAPPEN: NOT AT ALL
5. BEING SO RESTLESS THAT IT IS HARD TO SIT STILL: NOT AT ALL
1. FEELING NERVOUS, ANXIOUS, OR ON EDGE: NOT AT ALL
6. BECOMING EASILY ANNOYED OR IRRITABLE: NOT AT ALL
GAD7 TOTAL SCORE: 0
2. NOT BEING ABLE TO STOP OR CONTROL WORRYING: NOT AT ALL
IF YOU CHECKED OFF ANY PROBLEMS ON THIS QUESTIONNAIRE, HOW DIFFICULT HAVE THESE PROBLEMS MADE IT FOR YOU TO DO YOUR WORK, TAKE CARE OF THINGS AT HOME, OR GET ALONG WITH OTHER PEOPLE: NOT DIFFICULT AT ALL
3. WORRYING TOO MUCH ABOUT DIFFERENT THINGS: NOT AT ALL

## 2018-01-25 ASSESSMENT — PATIENT HEALTH QUESTIONNAIRE - PHQ9: 5. POOR APPETITE OR OVEREATING: NOT AT ALL

## 2018-01-25 NOTE — PROGRESS NOTES
SUBJECTIVE:   CC: Gagan Herrera is an 37 year old male who presents for preventative health visit.     Physical   Annual:     Getting at least 3 servings of Calcium per day::  NO    Bi-annual eye exam::  NO    Dental care twice a year::  NO    Sleep apnea or symptoms of sleep apnea::  None    Diet::  Regular (no restrictions)    Frequency of exercise::  1 day/week    Duration of exercise::  Less than 15 minutes    Taking medications regularly::  Not Applicable    Additional concerns today::  No          Fertility: has been trying over the past year to get pregnant with his girlfriend. She has been checked out and has no issues. She has 2 children from previous relationship that are 5 and 9. They both work double shifts on an opposite schedule so that there is always someone home with the kids. This makes timing difficult. Either not home at the same time or one or both are very tired. Also significant other with 3 UTI's this year and was being treated for bacterial prostatitis for 3 months. Has a lot of stress, but feels managing well. Wondering if viagra would be helpful in this situation.    Eczema: flares occasionally. Has a few spots on hands. Would like a refill on last cream.    Depression and Anxiety Follow-Up    Status since last visit: No change    Other associated symptoms:None    Complicating factors:     Significant life event: No     Current substance abuse: None    Off medication. Feels controlled. Has a lot of stress, but managing ok.     PHQ-9 4/4/2016 12/9/2016 7/10/2017   Total Score 0 0 5   Q9: Suicide Ideation Not at all Not at all Not at all     MALIK-7 SCORE 11/19/2015   Total Score 2       PHQ-9  English  PHQ-9   Any Language  MALIK-7  Suicide Assessment Five-step Evaluation and Treatment (SAFE-T)    Today's PHQ-2 Score:   PHQ-2 ( 1999 Pfizer) 1/25/2018   Q1: Little interest or pleasure in doing things 0   Q2: Feeling down, depressed or hopeless 0   PHQ-2 Score 0   Q1: Little interest or pleasure  in doing things Not at all   Q2: Feeling down, depressed or hopeless Not at all   PHQ-2 Score 0     Abuse: Current or Past(Physical, Sexual or Emotional)- No  Do you feel safe in your environment - Yes    Social History   Substance Use Topics     Smoking status: Former Smoker     Types: Cigarettes     Quit date: 2/17/2013     Smokeless tobacco: Former User     Quit date: 10/15/2013     Alcohol use 0.0 oz/week     0 Standard drinks or equivalent per week      Comment: soc     Alcohol Use 1/25/2018   If you drink alcohol, do you typically have greater than 3 drinks per day OR greater than 7 drinks per week?   No     Last PSA: No results found for: PSA    Reviewed orders with patient. Reviewed health maintenance and updated orders accordingly - Yes  Patient Active Problem List   Diagnosis     CARDIOVASCULAR SCREENING; LDL GOAL LESS THAN 160     Moderate major depression (H)     Recurrent cold sores     Anxiety     Past Surgical History:   Procedure Laterality Date     APPENDECTOMY       SINUS SURGERY         Social History   Substance Use Topics     Smoking status: Former Smoker     Types: Cigarettes     Quit date: 2/17/2013     Smokeless tobacco: Former User     Quit date: 10/15/2013     Alcohol use 0.0 oz/week     0 Standard drinks or equivalent per week      Comment: soc     Family History   Problem Relation Age of Onset     Breast Cancer Maternal Grandmother      Myocardial Infarction Maternal Grandfather      Chronic Obstructive Pulmonary Disease Paternal Grandfather      smoker     Unknown/Adopted Father          Reviewed and updated as needed this visit by clinical staff  Tobacco  Allergies  Meds  Problems  Med Hx  Surg Hx  Fam Hx  Soc Hx        Reviewed and updated as needed this visit by Provider  Allergies  Meds  Problems        Review of Systems  C: NEGATIVE for fever, chills, change in weight  I: NEGATIVE for worrisome rashes, moles or lesions  E: NEGATIVE for vision changes or irritation  ENT:  "NEGATIVE for ear, mouth and throat problems  R: NEGATIVE for significant cough or SOB  CV: NEGATIVE for chest pain, palpitations or peripheral edema  GI: NEGATIVE for nausea, abdominal pain, heartburn, or change in bowel habits   male: concerns for infertility, negative for dysuria, hematuria, decreased urinary stream, erectile dysfunction, urethral discharge  M: NEGATIVE for significant arthralgias or myalgia  N: NEGATIVE for weakness, dizziness or paresthesias  P: NEGATIVE for changes in mood or affect    OBJECTIVE:   BP 96/62 (BP Location: Right arm, Patient Position: Chair, Cuff Size: Adult Large)  Pulse 72  Temp 96.4  F (35.8  C) (Tympanic)  Ht 5' 9\" (1.753 m)  Wt 175 lb 9.6 oz (79.7 kg)  SpO2 97%  BMI 25.93 kg/m2    Physical Exam  GENERAL: healthy, alert and no distress  EYES: Eyes grossly normal to inspection, PERRL and conjunctivae and sclerae normal  HENT: ear canals and TM's normal, nose and mouth without ulcers or lesions  NECK: no adenopathy, no asymmetry, masses, or scars and thyroid normal to palpation  RESP: lungs clear to auscultation - no rales, rhonchi or wheezes  CV: regular rate and rhythm, normal S1 S2, no S3 or S4, no murmur, click or rub, no peripheral edema and peripheral pulses strong  ABDOMEN: soft, nontender, no hepatosplenomegaly, no masses and bowel sounds normal  MS: no gross musculoskeletal defects noted, no edema  SKIN: a couple of eczematous patches on dorsal hands, no suspicious lesions or rashes  NEURO: Normal strength and tone, mentation intact and speech normal  PSYCH: mentation appears normal, affect normal/bright    ASSESSMENT/PLAN:   1. Routine general medical examination at a health care facility  Tdap today  Check chol/diabetes screen  Declines flu vaccine    2. Flexural eczema  Needs refills today  - mometasone (ELOCON) 0.1 % cream; Apply topically daily as needed  Dispense: 15 g; Refill: 1    3. Moderate major depression (H)  4. Anxiety  Controlled, off medication. " "Continue self-cares.    5. Infertility counseling  Discussed at length. viagra not indicated. Most likely an issue with not being in the same place during most fertile periods. Recurrent UTIs and prostatitis also not helping. Discussed ovulation kits, intercourse every other day etc. Would recommend semen analysis if not able to conceive in next few months. Can either go through Urology or I am happy to order. I will have to complete the form and get him information.     6. Screening cholesterol level  - Lipid panel reflex to direct LDL Fasting    7. Screening for diabetes mellitus  - Comprehensive metabolic panel    8. Need for Tdap vaccination  - TDAP VACCINE (ADACEL)    COUNSELING:   Reviewed preventive health counseling, as reflected in patient instructions  Special attention given to:        Regular exercise       Healthy diet/nutrition       Family planning     reports that he quit smoking about 4 years ago. His smoking use included Cigarettes. He quit smokeless tobacco use about 4 years ago.    Estimated body mass index is 25.93 kg/(m^2) as calculated from the following:    Height as of this encounter: 5' 9\" (1.753 m).    Weight as of this encounter: 175 lb 9.6 oz (79.7 kg).   Weight management plan: Discussed healthy diet and exercise guidelines and patient will follow up in 12 months in clinic to re-evaluate.    Counseling Resources:  ATP IV Guidelines  Pooled Cohorts Equation Calculator  FRAX Risk Assessment  ICSI Preventive Guidelines  Dietary Guidelines for Americans, 2010  USDA's MyPlate  ASA Prophylaxis  Lung CA Screening    Cherie Magana MD  [facility] [place]    Answers for HPI/ROS submitted by the patient on 1/25/2018   PHQ-2 Score: 0    "

## 2018-01-25 NOTE — PATIENT INSTRUCTIONS
Preventive Health Recommendations  Male Ages 26 - 39    Yearly exam:             See your health care provider every year in order to  o   Review health changes.   o   Discuss preventive care.    o   Review your medicines if your doctor has prescribed any.    You should be tested each year for STDs (sexually transmitted diseases), if you re at risk.     After age 35, talk to your provider about cholesterol testing. If you are at risk for heart disease, have your cholesterol tested at least every 5 years.     If you are at risk for diabetes, you should have a diabetes test (fasting glucose).  Shots: Get a flu shot each year. Get a tetanus shot every 10 years.     Nutrition:    Eat at least 5 servings of fruits and vegetables daily.     Eat whole-grain bread, whole-wheat pasta and brown rice instead of white grains and rice.     Talk to your provider about Calcium and Vitamin D.     Lifestyle    Exercise for at least 150 minutes a week (30 minutes a day, 5 days a week). This will help you control your weight and prevent disease.     Limit alcohol to one drink per day.     No smoking.     Wear sunscreen to prevent skin cancer.     See your dentist every six months for an exam and cleaning.   ---------------  1. Labs today: cholesterol, diabetes screen  2. Let me know if you need the information for a semen analysis  3. Tetanus/whooping cough vaccine today

## 2018-01-25 NOTE — NURSING NOTE
"Chief Complaint   Patient presents with     Physical       Initial BP 96/62 (BP Location: Right arm, Patient Position: Chair, Cuff Size: Adult Large)  Pulse 72  Temp 96.4  F (35.8  C) (Tympanic)  Ht 5' 9\" (1.753 m)  Wt 175 lb 9.6 oz (79.7 kg)  SpO2 97%  BMI 25.93 kg/m2 Estimated body mass index is 25.93 kg/(m^2) as calculated from the following:    Height as of this encounter: 5' 9\" (1.753 m).    Weight as of this encounter: 175 lb 9.6 oz (79.7 kg).  Medication Reconciliation: complete   Meena Jo LPN      "

## 2018-01-25 NOTE — LETTER
[facility]  [address]  [place] [address]                  [phone]   January 26, 2018    Gagan Herrera  [address] [address]      Dear Gagan,    1. Your cholesterol looks improved from last year. Your total cholesterol is down to 205 from 235 (normal is 200 or less). Your LDL or bad cholesterol is down to 131 from 163 (normal is less than 100). Your HDL or good cholesterol is good at 52 (normal is 50 or higher). I recommend watching the amount of fats in your diet and increasing exercise. You should recheck your cholesterol in 2-3 years.    2. Your diabetes screen, liver function and kidney function are all normal.    Please let me know if you have any questions.    Sincerely,  Cherie Magana MD  Internal Medicine/Pediatrics      Results for orders placed or performed in visit on 01/25/18   Lipid panel reflex to direct LDL Fasting   Result Value Ref Range    Cholesterol 205 (H) <200 mg/dL    Triglycerides 111 <150 mg/dL    HDL Cholesterol 52 >39 mg/dL    LDL Cholesterol Calculated 131 (H) <100 mg/dL    Non HDL Cholesterol 153 (H) <130 mg/dL   Comprehensive metabolic panel   Result Value Ref Range    Sodium 140 133 - 144 mmol/L    Potassium 4.4 3.4 - 5.3 mmol/L    Chloride 107 94 - 109 mmol/L    Carbon Dioxide 30 20 - 32 mmol/L    Anion Gap 3 3 - 14 mmol/L    Glucose 85 70 - 99 mg/dL    Urea Nitrogen 14 7 - 30 mg/dL    Creatinine 1.03 0.66 - 1.25 mg/dL    GFR Estimate 80 >60 mL/min/1.7m2    GFR Estimate If Black >90 >60 mL/min/1.7m2    Calcium 8.9 8.5 - 10.1 mg/dL    Bilirubin Total 0.8 0.2 - 1.3 mg/dL    Albumin 4.4 3.4 - 5.0 g/dL    Protein Total 7.5 6.8 - 8.8 g/dL    Alkaline Phosphatase 57 40 - 150 U/L    ALT 22 0 - 70 U/L    AST 16 0 - 45 U/L

## 2018-01-25 NOTE — MR AVS SNAPSHOT
After Visit Summary   1/25/2018    Gagan Herrera    MRN: [id number]           Patient Information     Date Of Birth          1980        Visit Information        Provider Department      1/25/2018 9:00 AM Cherie Magana MD [facility]        Today's Diagnoses     Routine general medical examination at a health care facility    -  1    Screening cholesterol level        Screening for diabetes mellitus        Need for Tdap vaccination          Care Instructions      Preventive Health Recommendations  Male Ages 26 - 39    Yearly exam:             See your health care provider every year in order to  o   Review health changes.   o   Discuss preventive care.    o   Review your medicines if your doctor has prescribed any.    You should be tested each year for STDs (sexually transmitted diseases), if you re at risk.     After age 35, talk to your provider about cholesterol testing. If you are at risk for heart disease, have your cholesterol tested at least every 5 years.     If you are at risk for diabetes, you should have a diabetes test (fasting glucose).  Shots: Get a flu shot each year. Get a tetanus shot every 10 years.     Nutrition:    Eat at least 5 servings of fruits and vegetables daily.     Eat whole-grain bread, whole-wheat pasta and brown rice instead of white grains and rice.     Talk to your provider about Calcium and Vitamin D.     Lifestyle    Exercise for at least 150 minutes a week (30 minutes a day, 5 days a week). This will help you control your weight and prevent disease.     Limit alcohol to one drink per day.     No smoking.     Wear sunscreen to prevent skin cancer.     See your dentist every six months for an exam and cleaning.   ---------------  1. Labs today: cholesterol, diabetes screen  2. Let me know if you need the information for a semen analysis  3. Tetanus/whooping cough vaccine today          Follow-ups after your visit        Follow-up notes from  "your care team     Return in about 1 year (around 2019).      Who to contact     If you have questions or need follow up information about today's clinic visit or your schedule please contact [facility] [place] directly at [phone].  Normal or non-critical lab and imaging results will be communicated to you by MyChart, letter or phone within 4 business days after the clinic has received the results. If you do not hear from us within 7 days, please contact the clinic through Anctuhart or phone. If you have a critical or abnormal lab result, we will notify you by phone as soon as possible.  Submit refill requests through Capsearch or call your pharmacy and they will forward the refill request to us. Please allow 3 business days for your refill to be completed.          Additional Information About Your Visit        AnctuhariHandle Information     Capsearch lets you send messages to your doctor, view your test results, renew your prescriptions, schedule appointments and more. To sign up, go to [email or web address] . Click on \"Log in\" on the left side of the screen, which will take you to the Welcome page. Then click on \"Sign up Now\" on the right side of the page.     You will be asked to enter the access code listed below, as well as some personal information. Please follow the directions to create your username and password.     Your access code is: [email or web address]  Expires: 2018  9:45 AM     Your access code will  in 90 days. If you need help or a new code, please call your [place] clinic or [phone].        Care EveryWhere ID     This is your Care EveryWhere ID. This could be used by other organizations to access your [place] medical records  [id number]        Your Vitals Were     Pulse Temperature Height Pulse Oximetry BMI (Body Mass Index)       72 96.4  F (35.8  C) (Tympanic) 5' 9\" (1.753 m) 97% 25.93 kg/m2        Blood Pressure from Last 3 Encounters:   18 96/62   17 110/68 "   09/13/17 120/60    Weight from Last 3 Encounters:   01/25/18 175 lb 9.6 oz (79.7 kg)   09/29/17 175 lb (79.4 kg)   09/13/17 175 lb (79.4 kg)              We Performed the Following     Comprehensive metabolic panel     DEPRESSION ACTION PLAN (DAP)     Lipid panel reflex to direct LDL Fasting     TDAP VACCINE (ADACEL)        Primary Care Provider Office Phone # Fax #    Cherie Magana [phone]       [facility] [place] [address]        Equal Access to Services     [facility]: Hadii aad ku hadasho Soomaali, waaxda luqadaha, qaybta kaalmada adeegyada, waxsantiago ellis . So [facility] [phone].    ATENCIÓN: Si habla español, tiene a aly disposición servicios gratuitos de asistencia lingüística. [facility] [phone].    We comply with applicable federal civil rights laws and [place] laws. We do not discriminate on the basis of race, color, national origin, age, disability, sex, sexual orientation, or gender identity.            Thank you!     Thank you for choosing [facility] [place]  for your care. Our goal is always to provide you with excellent care. Hearing back from our patients is one way we can continue to improve our services. Please take a few minutes to complete the written survey that you may receive in the mail after your visit with us. Thank you!             Your Updated Medication List - Protect others around you: Learn how to safely use, store and throw away your medicines at www.disposemymeds.org.          This list is accurate as of 1/25/18  9:45 AM.  Always use your most recent med list.                   Brand Name Dispense Instructions for use Diagnosis    valACYclovir 500 MG tablet    VALTREX    90 tablet    Take 1 tablet (500 mg) by mouth daily    Recurrent cold sores

## 2018-01-26 ASSESSMENT — ANXIETY QUESTIONNAIRES: GAD7 TOTAL SCORE: 0

## 2018-01-26 ASSESSMENT — PATIENT HEALTH QUESTIONNAIRE - PHQ9: SUM OF ALL RESPONSES TO PHQ QUESTIONS 1-9: 0

## 2018-02-21 ENCOUNTER — OFFICE VISIT (OUTPATIENT)
Dept: PEDIATRICS | Facility: CLINIC | Age: 38
End: 2018-02-21
Payer: COMMERCIAL

## 2018-02-21 VITALS
OXYGEN SATURATION: 98 % | SYSTOLIC BLOOD PRESSURE: 110 MMHG | TEMPERATURE: 97 F | BODY MASS INDEX: 26.14 KG/M2 | WEIGHT: 176.5 LBS | HEIGHT: 69 IN | DIASTOLIC BLOOD PRESSURE: 70 MMHG | HEART RATE: 64 BPM

## 2018-02-21 DIAGNOSIS — F34.1 DYSTHYMIC DISORDER: Primary | ICD-10-CM

## 2018-02-21 PROCEDURE — 99213 OFFICE O/P EST LOW 20 MIN: CPT | Mod: GC | Performed by: STUDENT IN AN ORGANIZED HEALTH CARE EDUCATION/TRAINING PROGRAM

## 2018-02-21 RX ORDER — SERTRALINE HYDROCHLORIDE 25 MG/1
25 TABLET, FILM COATED ORAL DAILY
Qty: 60 TABLET | Refills: 1 | Status: SHIPPED | OUTPATIENT
Start: 2018-02-21 | End: 2018-03-30

## 2018-02-21 ASSESSMENT — PATIENT HEALTH QUESTIONNAIRE - PHQ9: 5. POOR APPETITE OR OVEREATING: SEVERAL DAYS

## 2018-02-21 ASSESSMENT — ANXIETY QUESTIONNAIRES
3. WORRYING TOO MUCH ABOUT DIFFERENT THINGS: SEVERAL DAYS
5. BEING SO RESTLESS THAT IT IS HARD TO SIT STILL: NOT AT ALL
1. FEELING NERVOUS, ANXIOUS, OR ON EDGE: SEVERAL DAYS
7. FEELING AFRAID AS IF SOMETHING AWFUL MIGHT HAPPEN: NOT AT ALL
2. NOT BEING ABLE TO STOP OR CONTROL WORRYING: NOT AT ALL
GAD7 TOTAL SCORE: 4
6. BECOMING EASILY ANNOYED OR IRRITABLE: SEVERAL DAYS
IF YOU CHECKED OFF ANY PROBLEMS ON THIS QUESTIONNAIRE, HOW DIFFICULT HAVE THESE PROBLEMS MADE IT FOR YOU TO DO YOUR WORK, TAKE CARE OF THINGS AT HOME, OR GET ALONG WITH OTHER PEOPLE: SOMEWHAT DIFFICULT

## 2018-02-21 NOTE — MR AVS SNAPSHOT
"              After Visit Summary   2018    Gagan Herrera    MRN: [id number]           Patient Information     Date Of Birth          1980        Visit Information        Provider Department      2018 8:15 AM De[facility]add[facility]        Today's Diagnoses     Dysthymic disorder    -  1      Care Instructions    1.Start taking sertaline 25 mg daily for 4-5 days and then taper up to 50 mg daily  2. Follow up in 1-2 months to check in how the medication is doing          Follow-ups after your visit        Who to contact     If you have questions or need follow up information about today's clinic visit or your schedule please contact [facility] directly at [phone].  Normal or non-critical lab and imaging results will be communicated to you by MyChart, letter or phone within 4 business days after the clinic has received the results. If you do not hear from us within 7 days, please contact the clinic through Nvigenhart or phone. If you have a critical or abnormal lab result, we will notify you by phone as soon as possible.  Submit refill requests through [email or web address] or call your pharmacy and they will forward the refill request to us. Please allow 3 business days for your refill to be completed.          Additional Information About Your Visit        MyChart Information     [email or web address] lets you send messages to your doctor, view your test results, renew your prescriptions, schedule appointments and more. To sign up, go to [email or web address] . Click on \"Log in\" on the left side of the screen, which will take you to the Welcome page. Then click on \"Sign up Now\" on the right side of the page.     You will be asked to enter the access code listed below, as well as some personal information. Please follow the directions to create your username and password.     Your access code is: [email or web address]  Expires: 2018  9:45 AM     Your access code will  in 90 days. If you need help " "or a new code, please call your [facility] or [phone].        Care EveryWhere ID     This is your Care EveryWhere ID. This could be used by other organizations to access your [place] medical records  [id number]        Your Vitals Were     Pulse Temperature Height Pulse Oximetry BMI (Body Mass Index)       64 97  F (36.1  C) (Tympanic) 5' 9\" (1.753 m) 98% 26.06 kg/m2        Blood Pressure from Last 3 Encounters:   02/21/18 110/70   01/25/18 96/62   09/29/17 110/68    Weight from Last 3 Encounters:   02/21/18 176 lb 8 oz (80.1 kg)   01/25/18 175 lb 9.6 oz (79.7 kg)   09/29/17 175 lb (79.4 kg)              Today, you had the following     No orders found for display         Today's Medication Changes          These changes are accurate as of 2/21/18  9:01 AM.  If you have any questions, ask your nurse or doctor.               Start taking these medicines.        Dose/Directions    sertraline 25 MG tablet   Commonly known as:  ZOLOFT   Used for:  Dysthymic disorder   Started by:  Debra Waddell MD        Dose:  25 mg   Take 1 tablet (25 mg) by mouth daily   Quantity:  60 tablet   Refills:  1            Where to get your medicines      These medications were sent to [facility] - [place], [place] - [address] AT [facility]  [address], [place] [address]     Phone:  [phone]     sertraline 25 MG tablet                Primary Care Provider Office Phone # Fax #    Cherie Magana [phone] [address] [place] [address]        Equal Access to Services     [facility]: Hadii luis garay Somelissa, waaxda luqadaha, qaybta kaalmada melissa early. So [facility] [phone].    ATENCIÓN: Si habla español, tiene a aly disposición servicios gratuitos de asistencia lingüística. Llame al [phone].    We comply with applicable federal civil rights laws and [place] laws. We do not discriminate on the " basis of race, color, national origin, age, disability, sex, sexual orientation, or gender identity.            Thank you!     Thank you for choosing [facility] [place]  for your care. Our goal is always to provide you with excellent care. Hearing back from our patients is one way we can continue to improve our services. Please take a few minutes to complete the written survey that you may receive in the mail after your visit with us. Thank you!             Your Updated Medication List - Protect others around you: Learn how to safely use, store and throw away your medicines at www.disposemymeds.org.          This list is accurate as of 2/21/18  9:01 AM.  Always use your most recent med list.                   Brand Name Dispense Instructions for use Diagnosis    mometasone 0.1 % cream    ELOCON    15 g    Apply topically daily as needed    Flexural eczema       sertraline 25 MG tablet    ZOLOFT    60 tablet    Take 1 tablet (25 mg) by mouth daily    Dysthymic disorder       valACYclovir 500 MG tablet    VALTREX    90 tablet    Take 1 tablet (500 mg) by mouth daily    Recurrent cold sores

## 2018-02-21 NOTE — PROGRESS NOTES
"  SUBJECTIVE:   Gagan Herrera is a 37 year old male who presents to clinic today for the following health issues:    Depression and Anxiety Follow-Up    Status since last visit: Worsened     Other associated symptoms:None    Complicating factors:     Significant life event: Yes-  Fiancee expecting child     Current substance abuse: None    PHQ-9 12/9/2016 7/10/2017 1/25/2018   Total Score 0 5 0   Q9: Suicide Ideation Not at all Not at all Not at all     PHQ-9: 3  MALIK: 4    MALIK-7 SCORE 11/19/2015 1/25/2018   Total Score 2 0     Was taking zoloft for number of years, have been off it \"for a while\" . Was taking 100 mg zoloft previously.  Pt states sonia is pregnant, both working, has 2 kids from previous, so it is a bit more stressful currently. Financially a bit burdensome. Fiance - argumentative, and pt does not want to argue with her and trying to be more understanding. States he doesn't feel \"unstable\" , states he is not sure what the zoloft \"ever did\" but is interested in starting it again to see if it would help him feel less agitated. Feeling a bit more frustrated, would rather not argue w/fiance     Never having opportunity to relax and unwind. Used to really enjoy work, but not as much as befor. Does not want to be problematic for fiance. Denies SI/HI. No hx of SI. Denies any anorexia, denies not enjoying things that he used to enjoy. Denies any anxiety.     PHQ-9  English  PHQ-9   Any Language  MALIK-7  Suicide Assessment Five-step Evaluation and Treatment (SAFE-T)    Amount of exercise or physical activity: None    Problems taking medications regularly: No    Medication side effects: none    Diet: regular (no restrictions)    Problem list and histories reviewed & adjusted, as indicated.  Additional history: as documented    Patient Active Problem List   Diagnosis     CARDIOVASCULAR SCREENING; LDL GOAL LESS THAN 160     Moderate major depression (H)     Recurrent cold sores     Anxiety     Past Surgical " "History:   Procedure Laterality Date     APPENDECTOMY       SINUS SURGERY         Social History   Substance Use Topics     Smoking status: Former Smoker     Types: Cigarettes     Quit date: 2/17/2013     Smokeless tobacco: Former User     Quit date: 10/15/2013     Alcohol use 0.0 oz/week     0 Standard drinks or equivalent per week      Comment: soc    Social alcohol   Lives w/fiance + 2 girls  Family History   Problem Relation Age of Onset     Breast Cancer Maternal Grandmother      Myocardial Infarction Maternal Grandfather      Chronic Obstructive Pulmonary Disease Paternal Grandfather      smoker     Unknown/Adopted Father          Current Outpatient Prescriptions   Medication Sig Dispense Refill     mometasone (ELOCON) 0.1 % cream Apply topically daily as needed 15 g 1     valACYclovir (VALTREX) 500 MG tablet Take 1 tablet (500 mg) by mouth daily 90 tablet 3     No Known Allergies    Reviewed and updated as needed this visit by clinical staff  Tobacco  Allergies  Meds  Med Hx  Surg Hx  Fam Hx  Soc Hx      Reviewed and updated as needed this visit by Provider    ROS:  Constitutional, HEENT, cardiovascular, pulmonary, gi and gu systems are negative, except as otherwise noted.    OBJECTIVE:     /70 (BP Location: Right arm, Patient Position: Sitting, Cuff Size: Adult Regular)  Pulse 64  Temp 97  F (36.1  C) (Tympanic)  Ht 5' 9\" (1.753 m)  Wt 176 lb 8 oz (80.1 kg)  SpO2 98%  BMI 26.06 kg/m2  Body mass index is 26.06 kg/(m^2).  GENERAL: healthy, alert and no distress  HEENT: EOMI intact, PERRLA  NECK: no adenopathy, no asymmetry, masses, or scars and thyroid normal to palpation  RESP: lungs clear to auscultation - no rales, rhonchi or wheezes  CV: regular rate and rhythm, normal S1 S2,, no murmur, no peripheral edema and peripheral pulses strong  ABDOMEN: soft, nontender, no hepatosplenomegaly, no masses and bowel sounds normal  MS: no gross musculoskeletal defects noted, no edema  Neuro: moving " all extremities spontaneously  Skin: Dry, no rashes    Diagnostic Test Results:  none   ASSESSMENT/PLAN:   Gagan Herrera is a 37 year old male w/no significant medical history presenting with more baseline stress and agitation, desiring to start back sertaline that he was on previously.    1. Dysthymic disorder  PHQ-9 score 3; MALIK 4. Pt stating increase in baseline stress/agitation 2/2 fiance getting pregnant recently. No SI/HI, and pt stating his mood at baseline is OK. He was on zoloft 100 mg Qday previously - unclear reasons but appears to be mostly for agitation/anger control; will restart 25 mg Qday and taper up to goal of 50 mg Qday.   - sertraline (ZOLOFT) 25 MG tablet; Take 1 tablet (25 mg) by mouth daily  Dispense: 60 tablet; Refill: 1  - taper sertaline to 50 mg q day after 4-5 days of 25 mg daily  - follow up in 1-2 months to check in how things are going with medication     Pt was staffed with Dr. Cherie Waddell MD  PGY 1 Med/Peds  [phone]      I have seen this patient and examined him in the presence of Dr. Waddell.  I was present during the key components of the presenting complaints, physical exam, diagnosis, and plan, and fully concur with the plan as listed below above in the resident's note.    Cherie Magana MD  Internal Medicine/Pediatrics

## 2018-02-21 NOTE — PATIENT INSTRUCTIONS
1.Start taking sertaline 25 mg daily for 4-5 days and then taper up to 50 mg daily  2. Follow up in 1-2 months to check in how the medication is doing

## 2018-02-22 ASSESSMENT — PATIENT HEALTH QUESTIONNAIRE - PHQ9: SUM OF ALL RESPONSES TO PHQ QUESTIONS 1-9: 3

## 2018-02-22 ASSESSMENT — ANXIETY QUESTIONNAIRES: GAD7 TOTAL SCORE: 4

## 2018-03-30 ENCOUNTER — OFFICE VISIT (OUTPATIENT)
Dept: URGENT CARE | Facility: URGENT CARE | Age: 38
End: 2018-03-30
Payer: COMMERCIAL

## 2018-03-30 VITALS
DIASTOLIC BLOOD PRESSURE: 78 MMHG | SYSTOLIC BLOOD PRESSURE: 108 MMHG | WEIGHT: 178 LBS | HEART RATE: 68 BPM | TEMPERATURE: 97.7 F | RESPIRATION RATE: 18 BRPM | BODY MASS INDEX: 26.29 KG/M2 | OXYGEN SATURATION: 99 %

## 2018-03-30 DIAGNOSIS — R30.0 DYSURIA: Primary | ICD-10-CM

## 2018-03-30 LAB
ALBUMIN UR-MCNC: NEGATIVE MG/DL
APPEARANCE UR: CLEAR
BILIRUB UR QL STRIP: NEGATIVE
COLOR UR AUTO: YELLOW
GLUCOSE UR STRIP-MCNC: NEGATIVE MG/DL
HGB UR QL STRIP: NEGATIVE
KETONES UR STRIP-MCNC: NEGATIVE MG/DL
LEUKOCYTE ESTERASE UR QL STRIP: NEGATIVE
NITRATE UR QL: NEGATIVE
PH UR STRIP: 7 PH (ref 5–7)
SOURCE: NORMAL
SP GR UR STRIP: 1.01 (ref 1–1.03)
UROBILINOGEN UR STRIP-ACNC: 0.2 EU/DL (ref 0.2–1)

## 2018-03-30 PROCEDURE — 87086 URINE CULTURE/COLONY COUNT: CPT | Performed by: PHYSICIAN ASSISTANT

## 2018-03-30 PROCEDURE — 81003 URINALYSIS AUTO W/O SCOPE: CPT | Performed by: FAMILY MEDICINE

## 2018-03-30 PROCEDURE — 99213 OFFICE O/P EST LOW 20 MIN: CPT | Performed by: PHYSICIAN ASSISTANT

## 2018-03-30 RX ORDER — CIPROFLOXACIN 500 MG/1
500 TABLET, FILM COATED ORAL 2 TIMES DAILY
Qty: 14 TABLET | Refills: 0 | Status: SHIPPED | OUTPATIENT
Start: 2018-03-30 | End: 2018-04-06

## 2018-03-30 NOTE — MR AVS SNAPSHOT
"              After Visit Summary   3/30/2018    Gagan Herrera    MRN: [id number]           Patient Information     Date Of Birth          1980        Visit Information        Provider Department      3/30/2018 7:45 PM Jennie Lou PA-C [facility]        Today's Diagnoses     Dysuria    -  1       Follow-ups after your visit        Who to contact     If you have questions or need follow up information about today's clinic visit or your schedule please contact [facility] directly at [phone].  Normal or non-critical lab and imaging results will be communicated to you by DND Consultinghart, letter or phone within 4 business days after the clinic has received the results. If you do not hear from us within 7 days, please contact the clinic through LifeGuard Gamest or phone. If you have a critical or abnormal lab result, we will notify you by phone as soon as possible.  Submit refill requests through Sosh or call your pharmacy and they will forward the refill request to us. Please allow 3 business days for your refill to be completed.          Additional Information About Your Visit        MyChart Information     Sosh lets you send messages to your doctor, view your test results, renew your prescriptions, schedule appointments and more. To sign up, go to [email or web address] . Click on \"Log in\" on the left side of the screen, which will take you to the Welcome page. Then click on \"Sign up Now\" on the right side of the page.     You will be asked to enter the access code listed below, as well as some personal information. Please follow the directions to create your username and password.     Your access code is: [email or web address]  Expires: 2018 10:45 AM     Your access code will  in 90 days. If you need help or a new code, please call your [place] clinic or [phone].        Care EveryWhere ID     This is your Care EveryWhere ID. This could be used by other organizations " to access your [place] medical records  [id number]        Your Vitals Were     Pulse Temperature Respirations Pulse Oximetry BMI (Body Mass Index)       68 97.7  F (36.5  C) (Oral) 18 99% 26.29 kg/m2        Blood Pressure from Last 3 Encounters:   03/30/18 108/78   02/21/18 110/70   01/25/18 96/62    Weight from Last 3 Encounters:   03/30/18 178 lb (80.7 kg)   02/21/18 176 lb 8 oz (80.1 kg)   01/25/18 175 lb 9.6 oz (79.7 kg)              We Performed the Following     *UA reflex to Microscopic and Culture ([place] and [facility] (except [place] and [place])     Urine Culture Aerobic Bacterial          Today's Medication Changes          These changes are accurate as of 3/30/18 11:59 PM.  If you have any questions, ask your nurse or doctor.               Start taking these medicines.        Dose/Directions    ciprofloxacin 500 MG tablet   Commonly known as:  CIPRO   Used for:  Dysuria   Started by:  Jennie Lou PA-C        Dose:  500 mg   Take 1 tablet (500 mg) by mouth 2 times daily for 7 days   Quantity:  14 tablet   Refills:  0            Where to get your medicines      These medications were sent to [facility] - [place], [place] - [address]  AT [facility] [address] [facility] [place] [address]     Phone:  [phone]     ciprofloxacin 500 MG tablet                Primary Care Provider Office Phone # Fax #    Cherie Magana [phone] [address] [place] [address]        Equal Access to Services     [facility]: Hadii aad ku hadasho Soomaali, waaxda luqadaha, qaybta kaalmada adeegyacarol, melissa idiin hayaan adeeg kharash la'aan . So [facility] [phone].    ATENCIÓN: Si habla español, tiene a aly disposición servicios gratuitos de asistencia lingüística. Llame al [phone].    We comply with applicable federal civil rights laws and [place] laws. We do not discriminate on the basis of race, color,  national origin, age, disability, sex, sexual orientation, or gender identity.            Thank you!     Thank you for choosing [facility] [place] [facility]  for your care. Our goal is always to provide you with excellent care. Hearing back from our patients is one way we can continue to improve our services. Please take a few minutes to complete the written survey that you may receive in the mail after your visit with us. Thank you!             Your Updated Medication List - Protect others around you: Learn how to safely use, store and throw away your medicines at www.disposemymeds.org.          This list is accurate as of 3/30/18 11:59 PM.  Always use your most recent med list.                   Brand Name Dispense Instructions for use Diagnosis    ciprofloxacin 500 MG tablet    CIPRO    14 tablet    Take 1 tablet (500 mg) by mouth 2 times daily for 7 days    Dysuria       mometasone 0.1 % cream    ELOCON    15 g    Apply topically daily as needed    Flexural eczema       valACYclovir 500 MG tablet    VALTREX    90 tablet    Take 1 tablet (500 mg) by mouth daily    Recurrent cold sores

## 2018-03-31 NOTE — PROGRESS NOTES
SUBJECTIVE:   Gagan Herrera is a 37 year old male who  presents today for a possible UTI. Symptoms of frequency and stream seems to be slightly decreased.  Hx of prostate infection and UTI.  No blood or pressure.    No pain.  No fevers have been going on for 2day(s).  Hematuria no.  gradual onset and still presentand mild.  There is no history of fever, chills, nausea or vomiting.  No history of vaginal or penile discharge. This patient does  have a history of urinary tract infections. Patient denies long duration, rigors, flank pain, temperature > 101 degrees F., Vomiting, significant nausea or diarrhea, taking Coumadin and GFR less than 30 within the last year     Past Medical History:   Diagnosis Date     STD (sexually transmitted disease)      Current Outpatient Prescriptions   Medication Sig Dispense Refill     ciprofloxacin (CIPRO) 500 MG tablet Take 1 tablet (500 mg) by mouth 2 times daily for 7 days 14 tablet 0     mometasone (ELOCON) 0.1 % cream Apply topically daily as needed 15 g 1     valACYclovir (VALTREX) 500 MG tablet Take 1 tablet (500 mg) by mouth daily 90 tablet 3     Social History   Substance Use Topics     Smoking status: Former Smoker     Types: Cigarettes     Quit date: 2/17/2013     Smokeless tobacco: Former User     Quit date: 10/15/2013     Alcohol use 0.0 oz/week     0 Standard drinks or equivalent per week      Comment: soc       ROS:   Review of systems negative except as stated above.    OBJECTIVE:  /78 (BP Location: Right arm, Patient Position: Chair, Cuff Size: Adult Regular)  Pulse 68  Temp 97.7  F (36.5  C) (Oral)  Resp 18  Wt 178 lb (80.7 kg)  SpO2 99%  BMI 26.29 kg/m2  GENERAL APPEARANCE: healthy, alert and no distress  RESP: lungs clear to auscultation - no rales, rhonchi or wheezes  CV: regular rates and rhythm, normal S1 S2, no murmur noted  ABDOMEN:  soft, nontender, no HSM or masses and bowel sounds normal  BACK: No CVA tenderness  Rectal exam: declines  GU_male:  declines  SKIN: no suspicious lesions or rashes    Results for orders placed or performed in visit on 03/30/18   *UA reflex to Microscopic and Culture ([facility] (except [place] and [place])   Result Value Ref Range    Color Urine Yellow     Appearance Urine Clear     Glucose Urine Negative NEG^Negative mg/dL    Bilirubin Urine Negative NEG^Negative    Ketones Urine Negative NEG^Negative mg/dL    Specific Gravity Urine 1.010 1.003 - 1.035    Blood Urine Negative NEG^Negative    pH Urine 7.0 5.0 - 7.0 pH    Protein Albumin Urine Negative NEG^Negative mg/dL    Urobilinogen Urine 0.2 0.2 - 1.0 EU/dL    Nitrite Urine Negative NEG^Negative    Leukocyte Esterase Urine Negative NEG^Negative    Source Midstream Urine    Urine Culture Aerobic Bacterial   Result Value Ref Range    Specimen Description Midstream Urine     Culture Micro No growth        assessment/plan:  (R30.0) Dysuria  (primary encounter diagnosis)  Comment:   Plan: *UA reflex to Microscopic and Culture ([facility]         and [facility] (except [place] and         [place]), ciprofloxacin (CIPRO) 500 MG tablet,         Urine Culture Aerobic Bacterial          No clear infection at this time and will culture.  rx for Cipro due to hx.  Start if sx persist.  Push fluids.  Advised to FU with Urology if not improved.

## 2018-04-02 LAB
BACTERIA SPEC CULT: NO GROWTH
SPECIMEN SOURCE: NORMAL

## 2018-05-29 ENCOUNTER — HOSPITAL ENCOUNTER (EMERGENCY)
Facility: CLINIC | Age: 38
Discharge: HOME | End: 2018-05-29
Attending: EMERGENCY MEDICINE | Admitting: EMERGENCY MEDICINE
Payer: COMMERCIAL

## 2018-05-29 ENCOUNTER — APPOINTMENT (OUTPATIENT)
Dept: GENERAL RADIOLOGY | Facility: CLINIC | Age: 38
End: 2018-05-29
Attending: EMERGENCY MEDICINE
Payer: COMMERCIAL

## 2018-05-29 VITALS
TEMPERATURE: 97.9 F | DIASTOLIC BLOOD PRESSURE: 88 MMHG | SYSTOLIC BLOOD PRESSURE: 123 MMHG | RESPIRATION RATE: 18 BRPM | OXYGEN SATURATION: 98 %

## 2018-05-29 DIAGNOSIS — R07.9 ACUTE CHEST PAIN: ICD-10-CM

## 2018-05-29 LAB
ANION GAP SERPL CALCULATED.3IONS-SCNC: 8 MMOL/L (ref 3–14)
BASOPHILS # BLD AUTO: 0.1 10E9/L (ref 0–0.2)
BASOPHILS NFR BLD AUTO: 0.7 %
BUN SERPL-MCNC: 11 MG/DL (ref 7–30)
CALCIUM SERPL-MCNC: 8.4 MG/DL (ref 8.5–10.1)
CHLORIDE SERPL-SCNC: 101 MMOL/L (ref 94–109)
CO2 SERPL-SCNC: 27 MMOL/L (ref 20–32)
CREAT SERPL-MCNC: 1.09 MG/DL (ref 0.66–1.25)
DIFFERENTIAL METHOD BLD: ABNORMAL
EOSINOPHIL # BLD AUTO: 0.1 10E9/L (ref 0–0.7)
EOSINOPHIL NFR BLD AUTO: 1.3 %
ERYTHROCYTE [DISTWIDTH] IN BLOOD BY AUTOMATED COUNT: 12 % (ref 10–15)
GFR SERPL CREATININE-BSD FRML MDRD: 75 ML/MIN/1.7M2
GLUCOSE SERPL-MCNC: 94 MG/DL (ref 70–99)
HCT VFR BLD AUTO: 39.8 % (ref 40–53)
HGB BLD-MCNC: 13.8 G/DL (ref 13.3–17.7)
IMM GRANULOCYTES # BLD: 0 10E9/L (ref 0–0.4)
IMM GRANULOCYTES NFR BLD: 0.3 %
LYMPHOCYTES # BLD AUTO: 1.6 10E9/L (ref 0.8–5.3)
LYMPHOCYTES NFR BLD AUTO: 23.1 %
MCH RBC QN AUTO: 30.3 PG (ref 26.5–33)
MCHC RBC AUTO-ENTMCNC: 34.7 G/DL (ref 31.5–36.5)
MCV RBC AUTO: 87 FL (ref 78–100)
MONOCYTES # BLD AUTO: 0.7 10E9/L (ref 0–1.3)
MONOCYTES NFR BLD AUTO: 10.1 %
NEUTROPHILS # BLD AUTO: 4.5 10E9/L (ref 1.6–8.3)
NEUTROPHILS NFR BLD AUTO: 64.5 %
NRBC # BLD AUTO: 0 10*3/UL
NRBC BLD AUTO-RTO: 0 /100
PLATELET # BLD AUTO: 194 10E9/L (ref 150–450)
POTASSIUM SERPL-SCNC: 3.6 MMOL/L (ref 3.4–5.3)
RBC # BLD AUTO: 4.56 10E12/L (ref 4.4–5.9)
SODIUM SERPL-SCNC: 136 MMOL/L (ref 133–144)
TROPONIN I SERPL-MCNC: <0.015 UG/L (ref 0–0.04)
WBC # BLD AUTO: 7 10E9/L (ref 4–11)

## 2018-05-29 PROCEDURE — 99285 EMERGENCY DEPT VISIT HI MDM: CPT | Mod: 25

## 2018-05-29 PROCEDURE — 25000128 H RX IP 250 OP 636: Performed by: EMERGENCY MEDICINE

## 2018-05-29 PROCEDURE — 84484 ASSAY OF TROPONIN QUANT: CPT | Performed by: EMERGENCY MEDICINE

## 2018-05-29 PROCEDURE — 85025 COMPLETE CBC W/AUTO DIFF WBC: CPT | Performed by: EMERGENCY MEDICINE

## 2018-05-29 PROCEDURE — 96374 THER/PROPH/DIAG INJ IV PUSH: CPT

## 2018-05-29 PROCEDURE — 71046 X-RAY EXAM CHEST 2 VIEWS: CPT

## 2018-05-29 PROCEDURE — 80048 BASIC METABOLIC PNL TOTAL CA: CPT | Performed by: EMERGENCY MEDICINE

## 2018-05-29 PROCEDURE — 93005 ELECTROCARDIOGRAM TRACING: CPT

## 2018-05-29 RX ORDER — KETOROLAC TROMETHAMINE 15 MG/ML
15 INJECTION, SOLUTION INTRAMUSCULAR; INTRAVENOUS ONCE
Status: COMPLETED | OUTPATIENT
Start: 2018-05-29 | End: 2018-05-29

## 2018-05-29 RX ADMIN — KETOROLAC TROMETHAMINE 15 MG: 15 INJECTION, SOLUTION INTRAMUSCULAR; INTRAVENOUS at 20:56

## 2018-05-29 ASSESSMENT — ENCOUNTER SYMPTOMS
ARTHRALGIAS: 1
ABDOMINAL PAIN: 0
LIGHT-HEADEDNESS: 1
NAUSEA: 1

## 2018-05-29 NOTE — ED AVS SNAPSHOT
[facility] Emergency Department    [address]    [facility] [address]    Phone:  [phone]    Fax:  [phone]                                       Gagan Herrera   MRN: [id number]    Department:  [facility] Emergency Department   Date of Visit:  5/29/2018           After Visit Summary Signature Page     I have received my discharge instructions, and my questions have been answered. I have discussed any challenges I see with this plan with the nurse or doctor.    ..........................................................................................................................................  Patient/Patient Representative Signature      ..........................................................................................................................................  Patient Representative Print Name and Relationship to Patient    ..................................................               ................................................  Date                                            Time    ..........................................................................................................................................  Reviewed by Signature/Title    ...................................................              ..............................................  Date                                                            Time

## 2018-05-29 NOTE — ED AVS SNAPSHOT
[facility] Emergency Department    [address] [place] [address]    Phone:  [phone]    Fax:  [phone]                                       Gagan Herrera   MRN: [id number]    Department:  [facility] Emergency Department   Date of Visit:  5/29/2018           Patient Information     Date Of Birth          1980        Your diagnoses for this visit were:     Acute chest pain        You were seen by Woody Quinteros MD.      Follow-up Information     Follow up with Cherie Magana MD In 2 days.    Specialties:  Internal Medicine, Pediatrics    Contact information:    [facility]   [place] [address]  [phone]          Discharge Instructions       Discharge Instructions  Chest Pain    You have been seen today for chest pain or discomfort.  At this time, your provider has found no signs that your chest pain is due to a serious or life-threatening condition, (or you have declined more testing and/or admission to the hospital). However, sometimes there is a serious problem that does not show up right away. Your evaluation today may not be complete and you may need further testing and evaluation.     Generally, every Emergency Department visit should have a follow-up clinic visit with either a primary or a specialty clinic/provider. Please follow-up as instructed by your emergency provider today.  Return to the Emergency Department if:    Your chest pain changes, gets worse, starts to happen more often, or comes with less activity.    You are newly short of breath.    You get very weak or tired.    You pass out or faint.    You have any new symptoms, like fever, cough, numb legs, or you cough up blood.    You have anything else that worries you.    Until you follow-up with your regular provider, please do the following:    Take one aspirin daily unless you have an allergy or are told not to by your provider.    If a stress test  appointment has been made, go to the appointment.    If you have questions, contact your regular provider.    Follow-up with your regular provider/clinic as directed; this is very important.    If you were given a prescription for medicine here today, be sure to read all of the information (including the package insert) that comes with your prescription.  This will include important information about the medicine, its side effects, and any warnings that you need to know about.  The pharmacist who fills the prescription can provide more information and answer questions you may have about the medicine.  If you have questions or concerns that the pharmacist cannot address, please call or return to the Emergency Department.       Remember that you can always come back to the Emergency Department if you are not able to see your regular provider in the amount of time listed above, if you get any new symptoms, or if there is anything that worries you.      24 Hour Appointment Hotline       To make an appointment at any [facility], call [phone] ([phone]). If you don't have a family doctor or clinic, we will help you find one. [facility] clinics are conveniently located to serve the needs of you and your family.             Review of your medicines      Our records show that you are taking the medicines listed below. If these are incorrect, please call your family doctor or clinic.        Dose / Directions Last dose taken    mometasone 0.1 % cream   Commonly known as:  ELOCON   Quantity:  15 g        Apply topically daily as needed   Refills:  1        valACYclovir 500 MG tablet   Commonly known as:  VALTREX   Dose:  500 mg   Quantity:  90 tablet        Take 1 tablet (500 mg) by mouth daily   Refills:  3                Procedures and tests performed during your visit     Basic metabolic panel    CBC with platelets differential    EKG 12 lead    Peripheral IV catheter    Troponin I    XR Chest 2 Views       Orders Needing Specimen Collection     None      Pending Results     Date and Time Order Name Status Description    5/29/2018 2037 XR Chest 2 Views Preliminary             Pending Culture Results     No orders found from 5/27/2018 to 5/30/2018.            Pending Results Instructions     If you had any lab results that were not finalized at the time of your Discharge, you can call the ED Lab Result RN at [phone]. You will be contacted by this team for any positive Lab results or changes in treatment. The nurses are available 7 days a week from 10A to 6:30P.  You can leave a message 24 hours per day and they will return your call.        Test Results From Your Hospital Stay        5/29/2018  8:59 PM      Component Results     Component Value Ref Range & Units Status    WBC 7.0 4.0 - 11.0 10e9/L Final    RBC Count 4.56 4.4 - 5.9 10e12/L Final    Hemoglobin 13.8 13.3 - 17.7 g/dL Final    Hematocrit 39.8 (L) 40.0 - 53.0 % Final    MCV 87 78 - 100 fl Final    MCH 30.3 26.5 - 33.0 pg Final    MCHC 34.7 31.5 - 36.5 g/dL Final    RDW 12.0 10.0 - 15.0 % Final    Platelet Count 194 150 - 450 10e9/L Final    Diff Method Automated Method  Final    % Neutrophils 64.5 % Final    % Lymphocytes 23.1 % Final    % Monocytes 10.1 % Final    % Eosinophils 1.3 % Final    % Basophils 0.7 % Final    % Immature Granulocytes 0.3 % Final    Nucleated RBCs 0 0 /100 Final    Absolute Neutrophil 4.5 1.6 - 8.3 10e9/L Final    Absolute Lymphocytes 1.6 0.8 - 5.3 10e9/L Final    Absolute Monocytes 0.7 0.0 - 1.3 10e9/L Final    Absolute Eosinophils 0.1 0.0 - 0.7 10e9/L Final    Absolute Basophils 0.1 0.0 - 0.2 10e9/L Final    Abs Immature Granulocytes 0.0 0 - 0.4 10e9/L Final    Absolute Nucleated RBC 0.0  Final         5/29/2018  9:17 PM      Component Results     Component Value Ref Range & Units Status    Sodium 136 133 - 144 mmol/L Final    Potassium 3.6 3.4 - 5.3 mmol/L Final    Chloride 101 94 - 109 mmol/L Final    Carbon Dioxide 27 20  - 32 mmol/L Final    Anion Gap 8 3 - 14 mmol/L Final    Glucose 94 70 - 99 mg/dL Final    Urea Nitrogen 11 7 - 30 mg/dL Final    Creatinine 1.09 0.66 - 1.25 mg/dL Final    GFR Estimate 75 >60 mL/min/1.7m2 Final    Non  GFR Calc    GFR Estimate If Black >90 >60 mL/min/1.7m2 Final    African American GFR Calc    Calcium 8.4 (L) 8.5 - 10.1 mg/dL Final         5/29/2018  9:17 PM      Component Results     Component Value Ref Range & Units Status    Troponin I ES <0.015 0.000 - 0.045 ug/L Final    The 99th percentile for upper reference range is 0.045 ug/L.  Troponin values   in the range of 0.045 - 0.120 ug/L may be associated with risks of adverse   clinical events.           5/29/2018  9:17 PM      Narrative     CHEST TWO VIEWS   5/29/2018 9:12 PM     HISTORY: Chest pain, evaluate for mediastinal widening, infiltrate.    COMPARISON: None.    FINDINGS: Heart size normal. Lungs clear. Mediastinum appears normal.        Impression     IMPRESSION: Negative.                Clinical Quality Measure: Blood Pressure Screening     Your blood pressure was checked while you were in the emergency department today. The last reading we obtained was  BP: (!) 136/105 . Please read the guidelines below about what these numbers mean and what you should do about them.  If your systolic blood pressure (the top number) is less than 120 and your diastolic blood pressure (the bottom number) is less than 80, then your blood pressure is normal. There is nothing more that you need to do about it.  If your systolic blood pressure (the top number) is 120-139 or your diastolic blood pressure (the bottom number) is 80-89, your blood pressure may be higher than it should be. You should have your blood pressure rechecked within a year by a primary care provider.  If your systolic blood pressure (the top number) is 140 or greater or your diastolic blood pressure (the bottom number) is 90 or greater, you may have high blood pressure.  "High blood pressure is treatable, but if left untreated over time it can put you at risk for heart attack, stroke, or kidney failure. You should have your blood pressure rechecked by a primary care provider within the next 4 weeks.  If your provider in the emergency department today gave you specific instructions to follow-up with your doctor or provider even sooner than that, you should follow that instruction and not wait for up to 4 weeks for your follow-up visit.        Thank you for choosing [facility]       Thank you for choosing [facility] for your care. Our goal is always to provide you with excellent care. Hearing back from our patients is one way we can continue to improve our services. Please take a few minutes to complete the written survey that you may receive in the mail after you visit with us. Thank you!        [facility] Information     [email or web address] lets you send messages to your doctor, view your test results, renew your prescriptions, schedule appointments and more. To sign up, go to [email or web address] . Click on \"Log in\" on the left side of the screen, which will take you to the Welcome page. Then click on \"Sign up Now\" on the right side of the page.     You will be asked to enter the access code listed below, as well as some personal information. Please follow the directions to create your username and password.     Your access code is: [email or web address]  Expires: 2018 10:00 PM     Your access code will  in 90 days. If you need help or a new code, please call your [facility] clinic or [phone].        Care EveryWhere ID     This is your Care EveryWhere ID. This could be used by other organizations to access your [facility] medical records  [id number]        Equal Access to Services     LUIS GARY : Wiley Perez, ely deleon, melissa gooden. So [facility] [phone].    ATENCIÓN: Si habla español, tiene a aly disposición " servicios gratuitos de asistencia lingüística. Therese mora [phone].    We comply with applicable federal civil rights laws and [place] laws. We do not discriminate on the basis of race, color, national origin, age, disability, sex, sexual orientation, or gender identity.            After Visit Summary       This is your record. Keep this with you and show to your community pharmacist(s) and doctor(s) at your next visit.

## 2018-05-30 LAB — INTERPRETATION ECG - MUSE: NORMAL

## 2018-05-30 NOTE — ED TRIAGE NOTES
"Pt presents with chest discomfort when taking taking a deep breath. Pt also endorses lightheadedness starting earlier in the evening and some nausea. Also states some numbness and \"pulsing\" in left arm and hand. ABCs intact.  "

## 2018-05-30 NOTE — ED PROVIDER NOTES
History     Chief Complaint:  Chest pain    HPI   Gagan Herrera is a 37 year old male with a history of hyperlipidemia and anxiety/depression who presents to the ED for evaluation of chest discomfort that comes on when he takes a deep breath. The patient states he started noticing this yesterday and the chest pain has been intermittent. He has some accompanying light headedness, nausea and arm pain. 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 Just two weeks ago he quit cigarette smoking and he mentions some recreation drug use when he was in his twenties. The patient denies leg swelling, abdominal pain.    Allergies:  No known drug allergies    Medications:    Valtrex  Elocon    Past Medical History:    STD  Anxiety  Recurrent cold sores  Depression  Hyperlipidemia    Past Surgical History:    Appendectomy  Sinus surgery    Family History:    Hyperlipidemia- mother  Hypertension- mother    Social History:  The patient was accompanied to the ED by his wife, who is expecting their first child.  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     Review of Systems   Cardiovascular: Positive for chest pain. Negative for leg swelling.   Gastrointestinal: Positive for nausea. Negative for abdominal pain.   Musculoskeletal: Positive for arthralgias (Left arm).   Neurological: Positive for light-headedness.   All other systems reviewed and are negative.    Physical Exam   First Vitals:  Patient Vitals for the past 24 hrs:   BP Temp Temp src Heart Rate Resp SpO2   05/29/18 2100 (!) 136/105 - - 78 - 99 %   05/29/18 2045 (!) 139/98 - - - - -   05/29/18 2021 (!) 145/101 97.9  F (36.6  C) Temporal 79 18 100 %     Physical Exam   General: Patient is alert and interactive when  I enter the room  Head:  The scalp, face, and head appear normal  Eyes:  The pupils are equal, round, and reactive to light    Conjunctivae and sclerae are normal  ENT:    External acoustic canals are normal    The oropharynx is normal without erythema.     Uvula is in the midline  Neck:  Normal range of motion  CV:  Regular rate. S1/S2. No murmurs.     Peripheral pulses including radial pulses are symmetric  Resp:  Lungs are clear without wheezes or rales. No distress  GI:  Abdomen is soft, no rigidity, guarding, or rebound    No distension. No tenderness to palpation in any quadrant.     MS:  Normal tone. Joints grossly normal without effusions.     No asymmetric leg swelling, calf or thigh tenderness.      Normal motor assessment of all extremities.    Chest wall is tender to palpation.    Skin:  No rash or lesions noted. Normal capillary refill noted  Neuro: Speech is normal and fluent. Face is symmetric.     Moving all extremities well.   Psych: Awake. Alert.  Normal affect.  Appropriate interactions.  Lymph: No anterior cervical lymphadenopathy noted    Emergency Department Course   ECG done at 2018. ECG read at 2021. Indication: Chest pain  Rate 75 bpm. OH interval 116. QRS duration 100. QT/QTc 366/408. P-R-T axes 29 45 19.  Normal sinus rhythm. Normal ECG.    Imaging:  Radiographic findings were communicated with the patient who voiced understanding of the findings.    Chest Xray:  Impression: Negative.  Preliminary result, per Radiology.    Laboratory:  CBC: WBC 7.0, HGB 13.8,   BMP: Calcium 8.4(L) o/w WNL (Creat 1.09)  Troponin: <0.015    Interventions:  2038 Toradol injection 15mg    Emergency Department Course:  Nursing notes and vitals reviewed.  I performed an exam of the patient as documented above.     2140 Findings and plan explained to the patient. Patient discharged home with instructions regarding supportive care, medications, and reasons to return. The importance of close follow-up was  reviewed.     Impression & Plan      Medical Decision Making:  Gagan Herrera is a 37 year old male who presents with chest pain.  The work up in the Emergency Department is negative.  I considered a broad differential diagnosis in this patient including life-threatening etiologies such as acute coronary syndrome, myocardial infarction, pulmonary embolism, acute aortic dissection, myocarditis, pericarditis, acute valvular insufficiency amongst others.  Other causes considered for this patient included pneumonia, pneumothorax, chest wall source, pericarditis, pleurisy, esophageal spasm, etc.  No serious etiology for the chest pain were detected today during this visit.  Close follow up with primary care is indicated should the pain continue, as further work up may be performed; this was made clear to the patient, who understands. Discussed HEART score with patient.     Diagnosis:    ICD-10-CM    1. Acute chest pain R07.9        Disposition:  The patient was discharged home.    5/29/2018   [facility] EMERGENCY DEPARTMENT    I, Jennie Mensah, am serving as a scribe at 2026 on May 29, 2018 to document services personally performed by Dr. Quinteros based on my observations and the provider's statements to me.       Woody Quinteros MD  05/30/18 0001

## 2018-07-28 DIAGNOSIS — B00.1 RECURRENT COLD SORES: ICD-10-CM

## 2018-08-01 RX ORDER — VALACYCLOVIR HYDROCHLORIDE 500 MG/1
TABLET, FILM COATED ORAL
Qty: 90 TABLET | Refills: 0 | Status: SHIPPED | OUTPATIENT
Start: 2018-08-01 | End: 2018-10-20

## 2018-08-01 NOTE — TELEPHONE ENCOUNTER
Prescription approved per [facility] Refill Protocol.    Gabbie Fleming RN

## 2018-10-24 ENCOUNTER — TELEPHONE (OUTPATIENT)
Dept: PEDIATRICS | Facility: CLINIC | Age: 38
End: 2018-10-24

## 2018-10-24 NOTE — TELEPHONE ENCOUNTER
Last office appointment was 18 and patient was started on Zoloft and told to follow-up in clinic in 1-2 months.  Patient will need an appointment to resume medication.  I spoke with patient and he states that he has increased stressors now.   Has a  at home with colic, and not getting much sleep at night.  His wife is having some post-partum depression  He also has two step-daughters, and finds that sometimes he loses his temper with them.  Is working, and needs to be better able to concentrate at work.      Financial concerns.  Feels that he needs to be able to better focus, and feels that he needs some help with managing increased stress.      No report of suicidal ideation.  Appointment scheduled tomorrow am with Dr. Magana.  Offered earlier appointment at another clinic, but patient declined.  JAZMINE Stovall RN

## 2018-10-24 NOTE — TELEPHONE ENCOUNTER
Zoloft     Last Written Prescription Date:  Some time ago  Last Fill Quantity: ,   # refills:   Last Office Visit: 1/25/18    Pt said from the last visit he talked to the  About possibly starting up on it again. He would like to go back on it.    Future Office visit:       Routing refill request to provider for review/approval because:  Drug not on the [facility], P or  Health refill protocol or controlled substance

## 2018-10-25 ENCOUNTER — OFFICE VISIT (OUTPATIENT)
Dept: PEDIATRICS | Facility: CLINIC | Age: 38
End: 2018-10-25
Payer: COMMERCIAL

## 2018-10-25 VITALS
HEART RATE: 84 BPM | OXYGEN SATURATION: 97 % | HEIGHT: 69 IN | SYSTOLIC BLOOD PRESSURE: 108 MMHG | TEMPERATURE: 97.3 F | BODY MASS INDEX: 26.19 KG/M2 | DIASTOLIC BLOOD PRESSURE: 66 MMHG | WEIGHT: 176.8 LBS

## 2018-10-25 DIAGNOSIS — Z23 NEED FOR PROPHYLACTIC VACCINATION AND INOCULATION AGAINST INFLUENZA: ICD-10-CM

## 2018-10-25 DIAGNOSIS — F32.1 MODERATE MAJOR DEPRESSION (H): Primary | ICD-10-CM

## 2018-10-25 DIAGNOSIS — F41.9 ANXIETY: ICD-10-CM

## 2018-10-25 PROCEDURE — 90686 IIV4 VACC NO PRSV 0.5 ML IM: CPT | Performed by: INTERNAL MEDICINE

## 2018-10-25 PROCEDURE — 90471 IMMUNIZATION ADMIN: CPT | Performed by: INTERNAL MEDICINE

## 2018-10-25 PROCEDURE — 99214 OFFICE O/P EST MOD 30 MIN: CPT | Mod: 25 | Performed by: INTERNAL MEDICINE

## 2018-10-25 ASSESSMENT — ANXIETY QUESTIONNAIRES
IF YOU CHECKED OFF ANY PROBLEMS ON THIS QUESTIONNAIRE, HOW DIFFICULT HAVE THESE PROBLEMS MADE IT FOR YOU TO DO YOUR WORK, TAKE CARE OF THINGS AT HOME, OR GET ALONG WITH OTHER PEOPLE: SOMEWHAT DIFFICULT
5. BEING SO RESTLESS THAT IT IS HARD TO SIT STILL: NOT AT ALL
3. WORRYING TOO MUCH ABOUT DIFFERENT THINGS: NEARLY EVERY DAY
GAD7 TOTAL SCORE: 11
1. FEELING NERVOUS, ANXIOUS, OR ON EDGE: SEVERAL DAYS
2. NOT BEING ABLE TO STOP OR CONTROL WORRYING: MORE THAN HALF THE DAYS
7. FEELING AFRAID AS IF SOMETHING AWFUL MIGHT HAPPEN: SEVERAL DAYS
6. BECOMING EASILY ANNOYED OR IRRITABLE: SEVERAL DAYS

## 2018-10-25 ASSESSMENT — PATIENT HEALTH QUESTIONNAIRE - PHQ9
SUM OF ALL RESPONSES TO PHQ QUESTIONS 1-9: 19
5. POOR APPETITE OR OVEREATING: NEARLY EVERY DAY

## 2018-10-25 NOTE — PROGRESS NOTES
SUBJECTIVE:   Gagan Herrera is a 38 year old male who presents to clinic today for the following health issues:    Depression and Anxiety Follow-Up    Status since last visit: Worsened     Other associated symptoms:None    Complicating factors:     Significant life event: Yes-  New baby (cholic), stepdaughters moved in     Current substance abuse: None    Patient with history of anxiety and depression previously treated with sertraline. Went off of the medication because he didn't feel he needed it any longer. Tried restarting earlier this year at low dose of 25 mg and had some headaches with it. Decided didn't really need to take the medication so stopped and was fine until the last few months. Baby born about a month ago and is colicky. Not getting much for sleep. Recently went back to work. Also with 2 stepdaughters and gets irritated and frustrated how little their father interacts with them. Has been fighting more with wife with all of the stressors and she is also dealing with some post-partum depression and started on medication. Have little support in this area. Her parents live here, but often are traveling and not very involved. His parents are in [place] and have already visited with plan to return again soon. Considering moving to be closer to his family for more support. No suicidal ideation.     PHQ 1/25/2018 2/21/2018 10/25/2018   PHQ-9 Total Score 0 3 19   Q9: Suicide Ideation Not at all Not at all Not at all     MALIK-7 SCORE 1/25/2018 2/21/2018 10/25/2018   Total Score 0 4 11       PHQ-9  English  PHQ-9   Any Language  MALIK-7  Suicide Assessment Five-step Evaluation and Treatment (SAFE-T)    Amount of exercise or physical activity: None    Problems taking medications regularly: No    Medication side effects: none    Diet: regular (no restrictions)    Reviewed and updated as needed this visit by clinical staff  Tobacco  Allergies  Meds  Problems  Med Hx  Surg Hx  Fam Hx  Soc Hx       "  Reviewed and updated as needed this visit by Provider  Allergies  Meds  Problems       -------------------------------------    ROS:  Constitutional, HEENT, cardiovascular, pulmonary, gi and gu systems are negative, except as otherwise noted.    OBJECTIVE:                                                    /66 (BP Location: Right arm, Patient Position: Sitting, Cuff Size: Adult Regular)  Pulse 84  Temp 97.3  F (36.3  C) (Tympanic)  Ht 5' 9\" (1.753 m)  Wt 176 lb 12.8 oz (80.2 kg)  SpO2 97%  BMI 26.11 kg/m2   Body mass index is 26.11 kg/(m^2).  General Appearance: healthy, alert and no distress  Eyes:   no discharge, erythema.  Normal pupils.  Skin: no rashes or lesions.  Well perfused and normal turgor.  Psychiatric: affect flat and easily agitated when talking about stressors    Diagnostic Test Results:  none      ASSESSMENT/PLAN:                                                      (F32.1) Moderate major depression (H)  (primary encounter diagnosis)  (F41.9) Anxiety  Comment: not well controlled. Lots of stressors currently and not getting much sleep  Plan: sertraline (ZOLOFT) 50 MG tablet  - discussed self cares. He is limiting ETOH already  - restart sertraline 25 mg for 1-2 weeks, then 50 mg for 1-2 weeks then 100 mg (previous dose)  - if he has significant headaches that are not improving with time, will need to consider different serotonin specific reuptake inhibitor. Briefly discussed fluoxetine vs escitalopram today     (Z23) Need for prophylactic vaccination and inoculation against influenza  Plan: FLU VACCINE, SPLIT VIRUS, IM (QUADRIVALENT)         [33014]- >3 YRS, Vaccine Administration,         Initial [66996]    FUTURE APPOINTMENTS:       - Follow-up visit in 6-8 weeks - he will schedule later    [facility] [place]          "

## 2018-10-25 NOTE — MR AVS SNAPSHOT
"              After Visit Summary   10/25/2018    Gagan Herrera    MRN: [id number]           Patient Information     Date Of Birth          1980        Visit Information        Provider Department      10/25/2018 10:20 AM Cherie Magana MD [facility]        Today's Diagnoses     Moderate major depression (H)    -  1    Anxiety          Care Instructions    Restart sertraline (Zoloft) Take 1/2 tablet (25 mg) for 1-2 weeks, then increase to 1 tablet orally daily for 1-2 weeks, then increase to 2 tablets daily  Follow-up in 6-8 weeks           Follow-ups after your visit        Follow-up notes from your care team     Return in about 6 weeks (around 12/6/2018).      Who to contact     If you have questions or need follow up information about today's clinic visit or your schedule please contact [facility] directly at [phone].  Normal or non-critical lab and imaging results will be communicated to you by MyChart, letter or phone within 4 business days after the clinic has received the results. If you do not hear from us within 7 days, please contact the clinic through MyChart or phone. If you have a critical or abnormal lab result, we will notify you by phone as soon as possible.  Submit refill requests through [employer] or call your pharmacy and they will forward the refill request to us. Please allow 3 business days for your refill to be completed.          Additional Information About Your Visit        MyChart Information     [employer] lets you send messages to your doctor, view your test results, renew your prescriptions, schedule appointments and more. To sign up, go to [email or web address] . Click on \"Log in\" on the left side of the screen, which will take you to the Welcome page. Then click on \"Sign up Now\" on the right side of the page.     You will be asked to enter the access code listed below, as well as some personal information. Please follow the directions to " "create your username and password.     Your access code is: [email or web address]  Expires: 2019 11:02 AM     Your access code will  in 90 days. If you need help or a new code, please call your [place] clinic or [phone].        Care EveryWhere ID     This is your Care EveryWhere ID. This could be used by other organizations to access your [place] medical records  [id number]        Your Vitals Were     Pulse Temperature Height Pulse Oximetry BMI (Body Mass Index)       84 97.3  F (36.3  C) (Tympanic) 5' 9\" (1.753 m) 97% 26.11 kg/m2        Blood Pressure from Last 3 Encounters:   10/25/18 108/66   18 123/88   18 108/78    Weight from Last 3 Encounters:   10/25/18 176 lb 12.8 oz (80.2 kg)   18 178 lb (80.7 kg)   18 176 lb 8 oz (80.1 kg)              Today, you had the following     No orders found for display         Today's Medication Changes          These changes are accurate as of 10/25/18 11:02 AM.  If you have any questions, ask your nurse or doctor.               Start taking these medicines.        Dose/Directions    sertraline 50 MG tablet   Commonly known as:  ZOLOFT   Used for:  Moderate major depression (H), Anxiety   Started by:  Cherie Magana MD        Take 1/2 tablet (25 mg) for 1-2 weeks, then increase to 1 tablet orally daily for 1-2 weeks, then increase to 2 tablets daily   Quantity:  60 tablet   Refills:  1            Where to get your medicines      These medications were sent to [facility] [address] AT [facility] [address]   [place] [address]     Phone:  [phone]     sertraline 50 MG tablet                Primary Care Provider Office Phone # Fax #    Cherie Magana [phone] [address] [place] [address]        Equal Access to Services     [facility]: Hadii luis Perez, waaxda luqadaha, melissa gooden" holland srivastavakarriray schwarz'aaomayra ah. So [facility] [phone].    ATENCIÓN: Si jocelinla luis enrique, tiene a aly disposición servicios gratuitos de asistencia lingüística. Therese al [phone].    We comply with applicable federal civil rights laws and [place] laws. We do not discriminate on the basis of race, color, national origin, age, disability, sex, sexual orientation, or gender identity.            Thank you!     Thank you for choosing [facility] [place]  for your care. Our goal is always to provide you with excellent care. Hearing back from our patients is one way we can continue to improve our services. Please take a few minutes to complete the written survey that you may receive in the mail after your visit with us. Thank you!             Your Updated Medication List - Protect others around you: Learn how to safely use, store and throw away your medicines at www.disposemymeds.org.          This list is accurate as of 10/25/18 11:02 AM.  Always use your most recent med list.                   Brand Name Dispense Instructions for use Diagnosis    mometasone 0.1 % cream    ELOCON    15 g    Apply topically daily as needed    Flexural eczema       sertraline 50 MG tablet    ZOLOFT    60 tablet    Take 1/2 tablet (25 mg) for 1-2 weeks, then increase to 1 tablet orally daily for 1-2 weeks, then increase to 2 tablets daily    Moderate major depression (H), Anxiety       valACYclovir 500 MG tablet    VALTREX    90 tablet    TAKE 1 TABLET(500 MG) BY MOUTH DAILY    Recurrent cold sores

## 2018-10-25 NOTE — PROGRESS NOTES

## 2018-10-25 NOTE — PATIENT INSTRUCTIONS
Restart sertraline (Zoloft) Take 1/2 tablet (25 mg) for 1-2 weeks, then increase to 1 tablet orally daily for 1-2 weeks, then increase to 2 tablets daily  Follow-up in 6-8 weeks

## 2018-10-26 ASSESSMENT — ANXIETY QUESTIONNAIRES: GAD7 TOTAL SCORE: 11

## 2018-11-07 ENCOUNTER — OFFICE VISIT (OUTPATIENT)
Dept: URGENT CARE | Facility: URGENT CARE | Age: 38
End: 2018-11-07
Payer: COMMERCIAL

## 2018-11-07 VITALS
HEART RATE: 79 BPM | BODY MASS INDEX: 26.14 KG/M2 | OXYGEN SATURATION: 96 % | WEIGHT: 177 LBS | SYSTOLIC BLOOD PRESSURE: 112 MMHG | TEMPERATURE: 97.2 F | DIASTOLIC BLOOD PRESSURE: 84 MMHG

## 2018-11-07 DIAGNOSIS — R30.0 DYSURIA: Primary | ICD-10-CM

## 2018-11-07 LAB
ALBUMIN UR-MCNC: NEGATIVE MG/DL
APPEARANCE UR: CLEAR
BILIRUB UR QL STRIP: NEGATIVE
COLOR UR AUTO: YELLOW
GLUCOSE UR STRIP-MCNC: NEGATIVE MG/DL
HGB UR QL STRIP: NEGATIVE
KETONES UR STRIP-MCNC: NEGATIVE MG/DL
LEUKOCYTE ESTERASE UR QL STRIP: NEGATIVE
NITRATE UR QL: NEGATIVE
PH UR STRIP: 6 PH (ref 5–7)
SOURCE: NORMAL
SP GR UR STRIP: <=1.005 (ref 1–1.03)
UROBILINOGEN UR STRIP-ACNC: 0.2 EU/DL (ref 0.2–1)

## 2018-11-07 PROCEDURE — 81003 URINALYSIS AUTO W/O SCOPE: CPT | Performed by: FAMILY MEDICINE

## 2018-11-07 PROCEDURE — 99213 OFFICE O/P EST LOW 20 MIN: CPT | Performed by: FAMILY MEDICINE

## 2018-11-07 RX ORDER — CIPROFLOXACIN 500 MG/1
500 TABLET, FILM COATED ORAL 2 TIMES DAILY
Qty: 14 TABLET | Refills: 0 | Status: SHIPPED | OUTPATIENT
Start: 2018-11-07 | End: 2018-11-09

## 2018-11-07 NOTE — MR AVS SNAPSHOT
"              After Visit Summary   2018    Gagan Herrera    MRN: [id number]           Patient Information     Date Of Birth          1980        Visit Information        Provider Department      2018 8:45 PM Clay Beckham MD [facility]        Today's Diagnoses     Dysuria    -  1       Follow-ups after your visit        Follow-up notes from your care team     Return in about 1 week (around 2018), or if symptoms worsen or fail to improve.      Who to contact     If you have questions or need follow up information about today's clinic visit or your schedule please contact [facility] directly at [phone].  Normal or non-critical lab and imaging results will be communicated to you by MyChart, letter or phone within 4 business days after the clinic has received the results. If you do not hear from us within 7 days, please contact the clinic through [employer] or phone. If you have a critical or abnormal lab result, we will notify you by phone as soon as possible.  Submit refill requests through [employer] or call your pharmacy and they will forward the refill request to us. Please allow 3 business days for your refill to be completed.          Additional Information About Your Visit        MyChart Information     [employer] lets you send messages to your doctor, view your test results, renew your prescriptions, schedule appointments and more. To sign up, go to [email or web address] . Click on \"Log in\" on the left side of the screen, which will take you to the Welcome page. Then click on \"Sign up Now\" on the right side of the page.     You will be asked to enter the access code listed below, as well as some personal information. Please follow the directions to create your username and password.     Your access code is: [email or web address]  Expires: 2019 10:02 AM     Your access code will  in 90 days. If you need help or a new code, please call your [place] clinic or " [phone].        Care EveryWhere ID     This is your Care EveryWhere ID. This could be used by other organizations to access your [place] medical records  [id number]        Your Vitals Were     Pulse Temperature Pulse Oximetry BMI (Body Mass Index)          79 97.2  F (36.2  C) (Tympanic) 96% 26.14 kg/m2         Blood Pressure from Last 3 Encounters:   11/09/18 (!) 133/93   11/09/18 104/64   11/07/18 112/84    Weight from Last 3 Encounters:   11/09/18 176 lb 5.9 oz (80 kg)   11/09/18 175 lb (79.4 kg)   11/07/18 177 lb (80.3 kg)              We Performed the Following     UA reflex to Microscopic and Culture          Today's Medication Changes          These changes are accurate as of 11/7/18 11:59 PM.  If you have any questions, ask your nurse or doctor.               Start taking these medicines.        Dose/Directions    ciprofloxacin 500 MG tablet   Commonly known as:  CIPRO   Used for:  Dysuria   Started by:  Clay Beckham MD        Dose:  500 mg   Take 1 tablet (500 mg) by mouth 2 times daily for 7 days   Quantity:  14 tablet   Refills:  0            Where to get your medicines      These medications were sent to [id number] - [place], [place] - [address]  AT [facility] [address] [facility] [place] [address]     Phone:  [phone]     ciprofloxacin 500 MG tablet                Primary Care Provider Office Phone # Fax #    Cherie Magana [phone] [address] [place] [address]        Equal Access to Services     [facility]: Hadii luis goodman hadasho Somelissa, waaxda luqadaha, qaybta kaalmada adeegyada, waxsantiago idiin hayaan adeeg kharash la'aan . So [facility] [phone].    ATENCIÓN: Si habla español, tiene a aly disposición servicios gratuitos de asistencia lingüística. Llame al [phone].    We comply with applicable federal civil rights laws and [place] laws. We do not discriminate on the basis of race, color, national  origin, age, disability, sex, sexual orientation, or gender identity.            Thank you!     Thank you for choosing [facility]  for your care. Our goal is always to provide you with excellent care. Hearing back from our patients is one way we can continue to improve our services. Please take a few minutes to complete the written survey that you may receive in the mail after your visit with us. Thank you!             Your Updated Medication List - Protect others around you: Learn how to safely use, store and throw away your medicines at www.disposemymeds.org.          This list is accurate as of 11/7/18 11:59 PM.  Always use your most recent med list.                   Brand Name Dispense Instructions for use Diagnosis    ciprofloxacin 500 MG tablet    CIPRO    14 tablet    Take 1 tablet (500 mg) by mouth 2 times daily for 7 days    Dysuria       mometasone 0.1 % cream    ELOCON    15 g    Apply topically daily as needed    Flexural eczema       sertraline 50 MG tablet    ZOLOFT    60 tablet    Take 1/2 tablet (25 mg) for 1-2 weeks, then increase to 1 tablet orally daily for 1-2 weeks, then increase to 2 tablets daily    Moderate major depression (H), Anxiety       valACYclovir 500 MG tablet    VALTREX    90 tablet    TAKE 1 TABLET(500 MG) BY MOUTH DAILY    Recurrent cold sores

## 2018-11-08 NOTE — PROGRESS NOTES
SUBJECTIVE:   Gagan Herrera is a 38 year old male who  presents today for a possible UTI. Symptoms of frequency have been going on for 1day(s).  Hematuria no.  sudden onset and worseningand moderate.  There is no history of fever, chills, nausea or vomiting.  No history of penile discharge. This patient does have a history of protatitis. Patient denies long duration, rigors and temperature > 101 degrees F.  Endorsed back pain and symptoms of discomfort.  Prior UTI about 3 years ago, due to prostatitis.    Past Medical History:   Diagnosis Date     STD (sexually transmitted disease)      Current Outpatient Prescriptions   Medication Sig Dispense Refill     sertraline (ZOLOFT) 50 MG tablet Take 1/2 tablet (25 mg) for 1-2 weeks, then increase to 1 tablet orally daily for 1-2 weeks, then increase to 2 tablets daily 60 tablet 1     valACYclovir (VALTREX) 500 MG tablet TAKE 1 TABLET(500 MG) BY MOUTH DAILY 90 tablet 1     mometasone (ELOCON) 0.1 % cream Apply topically daily as needed (Patient not taking: Reported on 11/7/2018) 15 g 1     Social History   Substance Use Topics     Smoking status: Former Smoker     Types: Cigarettes     Quit date: 2/17/2013     Smokeless tobacco: Former User     Quit date: 10/15/2013     Alcohol use 0.0 oz/week     0 Standard drinks or equivalent per week      Comment: soc       ROS:   Review of systems negative except as stated above.    OBJECTIVE:  /84  Pulse 79  Temp 97.2  F (36.2  C) (Tympanic)  Wt 177 lb (80.3 kg)  SpO2 96%  BMI 26.14 kg/m2  GENERAL APPEARANCE: healthy, alert and no distress  PSYCH: mentation appears normal and affect normal/bright      Results for orders placed or performed in visit on 11/07/18   UA reflex to Microscopic and Culture   Result Value Ref Range    Color Urine Yellow     Appearance Urine Clear     Glucose Urine Negative NEG^Negative mg/dL    Bilirubin Urine Negative NEG^Negative    Ketones Urine Negative NEG^Negative mg/dL    Specific Gravity  Urine <=1.005 1.003 - 1.035    Blood Urine Negative NEG^Negative    pH Urine 6.0 5.0 - 7.0 pH    Protein Albumin Urine Negative NEG^Negative mg/dL    Urobilinogen Urine 0.2 0.2 - 1.0 EU/dL    Nitrite Urine Negative NEG^Negative    Leukocyte Esterase Urine Negative NEG^Negative    Source Midstream Urine        ASSESSMENT/PLAN:   (R30.0) Dysuria  (primary encounter diagnosis)  Comment: prostatitis  Plan: UA reflex to Microscopic and Culture,         ciprofloxacin (CIPRO) 500 MG         tablet            Reviewed normal UA and low likelihood of UTI in male.  Due to history of chronic protatitis, will empirically treat with 1 week of Cipro as he reported doing well with this in the past.  Drink plenty of fluids.    Follow up with primary care physician if not improving.    Clay Beckham MD

## 2018-11-09 ENCOUNTER — NURSE TRIAGE (OUTPATIENT)
Dept: NURSING | Facility: CLINIC | Age: 38
End: 2018-11-09

## 2018-11-09 ENCOUNTER — TELEPHONE (OUTPATIENT)
Dept: PEDIATRICS | Facility: CLINIC | Age: 38
End: 2018-11-09

## 2018-11-09 ENCOUNTER — OFFICE VISIT (OUTPATIENT)
Dept: PEDIATRICS | Facility: CLINIC | Age: 38
End: 2018-11-09
Payer: COMMERCIAL

## 2018-11-09 ENCOUNTER — HOSPITAL ENCOUNTER (EMERGENCY)
Facility: CLINIC | Age: 38
Discharge: HOME | End: 2018-11-10
Attending: EMERGENCY MEDICINE | Admitting: EMERGENCY MEDICINE
Payer: COMMERCIAL

## 2018-11-09 VITALS
HEART RATE: 91 BPM | SYSTOLIC BLOOD PRESSURE: 104 MMHG | WEIGHT: 175 LBS | DIASTOLIC BLOOD PRESSURE: 64 MMHG | OXYGEN SATURATION: 98 % | BODY MASS INDEX: 25.92 KG/M2 | TEMPERATURE: 97.4 F | HEIGHT: 69 IN

## 2018-11-09 DIAGNOSIS — T36.95XA ADVERSE REACTION TO ANTIBIOTIC: ICD-10-CM

## 2018-11-09 DIAGNOSIS — F41.9 ANXIETY: ICD-10-CM

## 2018-11-09 DIAGNOSIS — R30.0 DYSURIA: ICD-10-CM

## 2018-11-09 DIAGNOSIS — E87.1 HYPONATREMIA: ICD-10-CM

## 2018-11-09 DIAGNOSIS — R20.2 PARESTHESIA: ICD-10-CM

## 2018-11-09 DIAGNOSIS — R07.89 ATYPICAL CHEST PAIN: ICD-10-CM

## 2018-11-09 LAB
ALBUMIN SERPL-MCNC: 4.6 G/DL (ref 3.4–5)
ALBUMIN UR-MCNC: NEGATIVE MG/DL
ALP SERPL-CCNC: 59 U/L (ref 40–150)
ALT SERPL W P-5'-P-CCNC: 27 U/L (ref 0–70)
ANION GAP SERPL CALCULATED.3IONS-SCNC: 8 MMOL/L (ref 3–14)
APPEARANCE UR: CLEAR
AST SERPL W P-5'-P-CCNC: 22 U/L (ref 0–45)
BASOPHILS # BLD AUTO: 0 10E9/L (ref 0–0.2)
BASOPHILS NFR BLD AUTO: 0.5 %
BILIRUB SERPL-MCNC: 1.1 MG/DL (ref 0.2–1.3)
BILIRUB UR QL STRIP: NEGATIVE
BUN SERPL-MCNC: 6 MG/DL (ref 7–30)
CALCIUM SERPL-MCNC: 8.7 MG/DL (ref 8.5–10.1)
CHLORIDE SERPL-SCNC: 95 MMOL/L (ref 94–109)
CK SERPL-CCNC: 200 U/L (ref 30–300)
CO2 SERPL-SCNC: 24 MMOL/L (ref 20–32)
COLOR UR AUTO: ABNORMAL
CREAT SERPL-MCNC: 0.86 MG/DL (ref 0.66–1.25)
DIFFERENTIAL METHOD BLD: NORMAL
EOSINOPHIL # BLD AUTO: 0.1 10E9/L (ref 0–0.7)
EOSINOPHIL NFR BLD AUTO: 0.7 %
ERYTHROCYTE [DISTWIDTH] IN BLOOD BY AUTOMATED COUNT: 11.2 % (ref 10–15)
GFR SERPL CREATININE-BSD FRML MDRD: >90 ML/MIN/1.7M2
GLUCOSE SERPL-MCNC: 91 MG/DL (ref 70–99)
GLUCOSE UR STRIP-MCNC: NEGATIVE MG/DL
HCT VFR BLD AUTO: 42.1 % (ref 40–53)
HGB BLD-MCNC: 14.6 G/DL (ref 13.3–17.7)
HGB UR QL STRIP: NEGATIVE
IMM GRANULOCYTES # BLD: 0 10E9/L (ref 0–0.4)
IMM GRANULOCYTES NFR BLD: 0.2 %
KETONES UR STRIP-MCNC: 5 MG/DL
LEUKOCYTE ESTERASE UR QL STRIP: NEGATIVE
LYMPHOCYTES # BLD AUTO: 1.9 10E9/L (ref 0.8–5.3)
LYMPHOCYTES NFR BLD AUTO: 22.5 %
MCH RBC QN AUTO: 29.9 PG (ref 26.5–33)
MCHC RBC AUTO-ENTMCNC: 34.7 G/DL (ref 31.5–36.5)
MCV RBC AUTO: 86 FL (ref 78–100)
MONOCYTES # BLD AUTO: 0.8 10E9/L (ref 0–1.3)
MONOCYTES NFR BLD AUTO: 9.7 %
NEUTROPHILS # BLD AUTO: 5.7 10E9/L (ref 1.6–8.3)
NEUTROPHILS NFR BLD AUTO: 66.4 %
NITRATE UR QL: NEGATIVE
NRBC # BLD AUTO: 0 10*3/UL
NRBC BLD AUTO-RTO: 0 /100
PH UR STRIP: 6 PH (ref 5–7)
PLATELET # BLD AUTO: 231 10E9/L (ref 150–450)
POTASSIUM SERPL-SCNC: 3.7 MMOL/L (ref 3.4–5.3)
PROT SERPL-MCNC: 8 G/DL (ref 6.8–8.8)
RBC # BLD AUTO: 4.88 10E12/L (ref 4.4–5.9)
RBC #/AREA URNS AUTO: 0 /HPF (ref 0–2)
SODIUM SERPL-SCNC: 127 MMOL/L (ref 133–144)
SOURCE: ABNORMAL
SP GR UR STRIP: 1 (ref 1–1.03)
TROPONIN I SERPL-MCNC: <0.015 UG/L (ref 0–0.04)
UROBILINOGEN UR STRIP-MCNC: 0 MG/DL (ref 0–2)
WBC # BLD AUTO: 8.5 10E9/L (ref 4–11)
WBC #/AREA URNS AUTO: 0 /HPF (ref 0–5)

## 2018-11-09 PROCEDURE — 84484 ASSAY OF TROPONIN QUANT: CPT | Performed by: EMERGENCY MEDICINE

## 2018-11-09 PROCEDURE — 82550 ASSAY OF CK (CPK): CPT | Performed by: EMERGENCY MEDICINE

## 2018-11-09 PROCEDURE — 93005 ELECTROCARDIOGRAM TRACING: CPT

## 2018-11-09 PROCEDURE — 80053 COMPREHEN METABOLIC PANEL: CPT | Performed by: EMERGENCY MEDICINE

## 2018-11-09 PROCEDURE — 85025 COMPLETE CBC W/AUTO DIFF WBC: CPT | Performed by: EMERGENCY MEDICINE

## 2018-11-09 PROCEDURE — 25000128 H RX IP 250 OP 636: Performed by: EMERGENCY MEDICINE

## 2018-11-09 PROCEDURE — 96360 HYDRATION IV INFUSION INIT: CPT

## 2018-11-09 PROCEDURE — 99284 EMERGENCY DEPT VISIT MOD MDM: CPT | Mod: 25

## 2018-11-09 PROCEDURE — 81001 URINALYSIS AUTO W/SCOPE: CPT | Performed by: EMERGENCY MEDICINE

## 2018-11-09 PROCEDURE — 99214 OFFICE O/P EST MOD 30 MIN: CPT | Performed by: NURSE PRACTITIONER

## 2018-11-09 RX ORDER — CIPROFLOXACIN 500 MG/1
500 TABLET, FILM COATED ORAL 2 TIMES DAILY
Qty: 6 TABLET | Refills: 0 | Status: SHIPPED | OUTPATIENT
Start: 2018-11-09 | End: 2019-03-07

## 2018-11-09 RX ORDER — CIPROFLOXACIN 500 MG/1
500 TABLET, FILM COATED ORAL 2 TIMES DAILY
Qty: 42 TABLET | Refills: 0 | Status: SHIPPED | OUTPATIENT
Start: 2018-11-09 | End: 2018-11-09

## 2018-11-09 RX ORDER — SODIUM CHLORIDE, SODIUM LACTATE, POTASSIUM CHLORIDE, CALCIUM CHLORIDE 600; 310; 30; 20 MG/100ML; MG/100ML; MG/100ML; MG/100ML
1000 INJECTION, SOLUTION INTRAVENOUS CONTINUOUS
Status: DISCONTINUED | OUTPATIENT
Start: 2018-11-09 | End: 2018-11-10 | Stop reason: HOSPADM

## 2018-11-09 RX ADMIN — SODIUM CHLORIDE, POTASSIUM CHLORIDE, SODIUM LACTATE AND CALCIUM CHLORIDE 1000 ML: 600; 310; 30; 20 INJECTION, SOLUTION INTRAVENOUS at 23:17

## 2018-11-09 ASSESSMENT — ENCOUNTER SYMPTOMS
HEADACHES: 1
NERVOUS/ANXIOUS: 1
VOMITING: 0
CHEST TIGHTNESS: 1
SHORTNESS OF BREATH: 1
DIZZINESS: 1
NAUSEA: 1

## 2018-11-09 NOTE — TELEPHONE ENCOUNTER
Additional Information    [1] Taking antibiotic < 72 hours (3 days) for UTI AND [2] painful urination or frequency not improved   (all triage questions negative)    Negative: Diabetes mellitus or weak immune system (e.g., HIV positive, cancer chemotherapy, transplant patient)    Negative: Sickle cell disease    Negative: [1] Taking antibiotic < 24 hours for UTI AND [2] fever persists   (all triage questions negative)    Negative: [1] Taking antibiotic > 72 hours (3 days) for UTI AND [2] painful urination or frequency not improved    Negative: [1] Taking antibiotic > 72 hours (3 days) for UTI AND [2] pain (e.g., flank, scrotum) not improved    Negative: [1] Taking antibiotic > 72 hours (3 days) for UTI-STD AND [2] penile discharge (yellow or white pus) not improved    Protocols used: URINARY TRACT INFECTION ON ANTIBIOTIC FOLLOW-UP CALL [facility]

## 2018-11-09 NOTE — TELEPHONE ENCOUNTER
Left voicemail letting patient know a medication was sent to his pharmacy. Asked that he call back so we can give detailed message below.     When patient calls back, TC, ok to relay message below.

## 2018-11-09 NOTE — MR AVS SNAPSHOT
After Visit Summary   11/9/2018    Gagan Herrera    MRN: [id number]           Patient Information     Date Of Birth          1980        Visit Information        Provider Department      11/9/2018 10:20 AM Samantha Orozco APRN CNP [facility] [place]        Today's Diagnoses     Dysuria          Care Instructions    Check out ECFE  Go for a walk 3x per week  Healthy dinner 3x per week  Avoid caffeine.     Continue Cipro. I'll call you Monday with a plan.   Over the weekend:   -push fluids  -Relaxation  -Hot bath          Follow-ups after your visit        Additional Services     UROLOGY ADULT REFERRAL       Your provider has referred you to: [facility]: [facility] [place] [phone]   [email or web address]    Please be aware that coverage of these services is subject to the terms and limitations of your health insurance plan.  Call member services at your health plan with any benefit or coverage questions.      Please bring the following with you to your appointment:    (1) Any X-Rays, CTs or MRIs which have been performed.  Contact the facility where they were done to arrange for  prior to your scheduled appointment.    (2) List of current medications  (3) This referral request   (4) Any documents/labs given to you for this referral                  Who to contact     If you have questions or need follow up information about today's clinic visit or your schedule please [facility] at [phone].  Normal or non-critical lab and imaging results will be communicated to you by MyChart, letter or phone within 4 business days after the clinic has received the results. If you do not hear from us within 7 days, please contact the clinic through MyChart or phone. If you have a critical or abnormal lab result, we will notify you by phone as soon as possible.  Submit refill requests through [employer] or call your pharmacy  "and they will forward the refill request to us. Please allow 3 business days for your refill to be completed.          Additional Information About Your Visit        MyChart Information     [employer] lets you send messages to your doctor, view your test results, renew your prescriptions, schedule appointments and more. To sign up, go to [email or web address] . Click on \"Log in\" on the left side of the screen, which will take you to the Welcome page. Then click on \"Sign up Now\" on the right side of the page.     You will be asked to enter the access code listed below, as well as some personal information. Please follow the directions to create your username and password.     Your access code is: [email or web address]  Expires: 2019 10:02 AM     Your access code will  in 90 days. If you need help or a new code, please call your [facility] clinic or [phone].        Care EveryWhere ID     This is your [facility] EveryWhere ID. This could be used by other organizations to access your [facility] medical records  [id number]        Your Vitals Were     Pulse Temperature Height Pulse Oximetry BMI (Body Mass Index)       91 97.4  F (36.3  C) (Tympanic) 5' 9\" (1.753 m) 98% 25.84 kg/m2        Blood Pressure from Last 3 Encounters:   18 104/64   18 112/84   10/25/18 108/66    Weight from Last 3 Encounters:   18 175 lb (79.4 kg)   18 177 lb (80.3 kg)   10/25/18 176 lb 12.8 oz (80.2 kg)              We Performed the Following     UROLOGY ADULT REFERRAL          Where to get your medicines      These medications were sent to [id number] [place] -  [place] [address] AT [facility] [address]   [place] [address]     Phone:  [phone]     ciprofloxacin 500 MG tablet          Primary Care Provider Office Phone # Fax #    Cherie Magana [phone]       [facility] [place] [id number]        Equal Access to Services     [facility]: Hadii " luis Perez, wacharlotteda cesaradaha, qaybta kaalsebastian vitaneri, waxsantiago lorene srivastavakarriray ellis waylon. So [facility] [phone].    ATENCIÓN: Si habla luis enrique, tiene a aly disposición servicios gratuitos de asistencia lingüística. Therese al [phone].    We comply with applicable federal civil rights laws and [place] laws. We do not discriminate on the basis of race, color, national origin, age, disability, sex, sexual orientation, or gender identity.            Thank you!     Thank you for choosing [facility] [place]  for your care. Our goal is always to provide you with excellent care. Hearing back from our patients is one way we can continue to improve our services. Please take a few minutes to complete the written survey that you may receive in the mail after your visit with us. Thank you!             Your Updated Medication List - Protect others around you: Learn how to safely use, store and throw away your medicines at www.disposemymeds.org.          This list is accurate as of 11/9/18 11:08 AM.  Always use your most recent med list.                   Brand Name Dispense Instructions for use Diagnosis    ciprofloxacin 500 MG tablet    CIPRO    42 tablet    Take 1 tablet (500 mg) by mouth 2 times daily for 21 days    Dysuria       mometasone 0.1 % cream    ELOCON    15 g    Apply topically daily as needed    Flexural eczema       sertraline 50 MG tablet    ZOLOFT    60 tablet    Take 1/2 tablet (25 mg) for 1-2 weeks, then increase to 1 tablet orally daily for 1-2 weeks, then increase to 2 tablets daily    Moderate major depression (H), Anxiety       valACYclovir 500 MG tablet    VALTREX    90 tablet    TAKE 1 TABLET(500 MG) BY MOUTH DAILY    Recurrent cold sores

## 2018-11-09 NOTE — PATIENT INSTRUCTIONS
Check out ECFE  Go for a walk 3x per week  Healthy dinner 3x per week  Avoid caffeine.     Continue Cipro. I'll call you Monday with a plan.   Over the weekend:   -push fluids  -Relaxation  -Hot bath

## 2018-11-09 NOTE — ED AVS SNAPSHOT
[facility] Emergency Department    [address] [place] [address]    Phone:  [phone]    Fax:  [phone]                                       Gagan Herrera   MRN: [id number]    Department:  [facility] Emergency Department   Date of Visit:  11/9/2018           Patient Information     Date Of Birth          1980        Your diagnoses for this visit were:     Anxiety     Atypical chest pain     Paresthesia     Adverse reaction to antibiotic     Hyponatremia        You were seen by Yonis Hogan MD.      Follow-up Information     Follow up with Cherie Magana MD. Call in 2 days.    Specialties:  Internal Medicine, Pediatrics    Contact information:    [address] [place] [address] [address] [place] [address] [phone]          Discharge Instructions       Your Zoloft may be causing your low sodium. Please follow up with your doctor on starting alternative medication to Zoloft     Discharge Instructions  Chest Pain    You have been seen today for chest pain or discomfort.  At this time, your doctor has found no signs that your chest pain is due to a serious or life-threatening condition, (or you have declined more testing and/or admission to the hospital). However, sometimes there is a serious problem that does not show up right away. Your evaluation today may not be complete and you may need further testing and evaluation.     You need to follow-up with your regular doctor within 3 days.    Return to the Emergency Department if:    Your chest pain changes, gets worse, starts to happen more often, or comes with less activity.    You are short of breath.    You get very weak or tired.    You pass out or faint.    You have any new symptoms, like fever, cough, numb legs, or you cough up blood.    You have anything else that worries you.    Until you follow-up with your regular doctor please do the following:    Take one aspirin daily unless you have an allergy or  are told not to by your doctor.    If a stress test appointment has been made, go to the appointment.    If you have questions, contact your regular doctor.    If your doctor today has told you to follow-up with your regular doctor, it is very important that you make an appointment with your clinic and go to the appointment.  If you do not follow-up with your primary doctor, it may result in missing an important development which could result in permanent injury or disability and/or lasting pain.  If there is any problem keeping your appointment, call your doctor or return to the Emergency Department.    If you were given a prescription for medicine here today, be sure to read all of the information (including the package insert) that comes with your prescription.  This will include important information about the medicine, its side effects, and any warnings that you need to know about.  The pharmacist who fills the prescription can provide more information and answer questions you may have about the medicine.  If you have questions or concerns that the pharmacist cannot address, please call or return to the Emergency Department.     Opioid Medication Information    Pain medications are among the most commonly prescribed medicines, so we are including this information for all our patients. If you did not receive pain medication or get a prescription for pain medicine, you can ignore it.     You may have been given a prescription for an opioid (narcotic) pain medicine and/or have received a pain medicine while here in the Emergency Department. These medicines can make you drowsy or impaired. You must not drive, operate dangerous equipment, or engage in any other dangerous activities while taking these medications. If you drive while taking these medications, you could be arrested for DUI, or driving under the influence. Do not drink any alcohol while you are taking these medications.     Opioid pain medications can  cause addiction. If you have a history of chemical dependency of any type, you are at a higher risk of becoming addicted to pain medications.  Only take these prescribed medications to treat your pain when all other options have been tried. Take it for as short a time and as few doses as possible. Store your pain pills in a secure place, as they are frequently stolen and provide a dangerous opportunity for children or visitors in your house to start abusing these powerful medications. We will not replace any lost or stolen medicine.  As soon as your pain is better, you should flush all your remaining medication.     Many prescription pain medications contain Tylenol  (acetaminophen), including Vicodin , Tylenol #3 , Norco , Lortab , and Percocet .  You should not take any extra pills of Tylenol  if you are using these prescription medications or you can get very sick.  Do not ever take more than 3000 mg of acetaminophen in any 24 hour period.    All opioids tend to cause constipation. Drink plenty of water and eat foods that have a lot of fiber, such as fruits, vegetables, prune juice, apple juice and high fiber cereal.  Take a laxative if you don t move your bowels at least every other day. Miralax , Milk of Magnesia, Colace , or Senna  can be used to keep you regular.      Remember that you can always come back to the Emergency Department if you are not able to see your regular doctor in the amount of time listed above, if you get any new symptoms, or if there is anything that worries you.          Reaction to Medicine (Other Type)  You are having a reaction to a medicine you have taken. This may not be the same as an allergic reaction. It is an undesired, unfavorable reaction, or a side effect of a medicine. This can cause a variety of symptoms, including:    Dizziness or headache    Rash    Flushing or hot sensation    Nausea, vomiting, or stomach pain    Diarrhea or constipation    Trouble breathing    High or  low blood pressure  A reaction can be an upset stomach from something like aspirin or ibuprofen, feeling faint after taking a blood pressure medicine, feeling anxious, and many other things. Symptoms of a medicine reaction can range from very mild to very severe.  In most cases, the reaction goes away within 1 to 12 hours. But it will probably occur again if you take this same medicine. Your healthcare provider will advise you whether to change how much, when, or how often you take this medicine. He or she may also advise you to discontinue this medicine or switch to another one.   Home care    Another medicine may be recommended to reduce your symptoms until the medicine s effect wears off. Follow your healthcare provider s advice.    When the medicine s effect has worn off, there should be no further problem as long as you don't take the same medicine again.     Ask your healthcare provider if you should also avoid similar medicines. Write down the information so you will remember it.    Make certain the medicine reaction is documented in your medical record.  Follow-up care  Follow up with your healthcare provider, or as advised if your symptoms are not better within 24 hours.  When to seek medical advice  Call your healthcare provider right away if any of these occur.    New symptoms that concern you    Worsening of your current symptoms, including rash or facial swelling    Symptoms that are not relieved by the treatment advised    Fever of 100.4 F (38 C) or higher, or as advised by your healthcare provider  Call 911  Call 911 if any of these occur:    Trouble breathing or swallowing, or wheezing    Hoarse voice or trouble speaking    Confusion    Extreme drowsiness or trouble awakening    Fainting or loss of consciousness    Rapid heart rate or slow heart rate    Very low or very high blood pressure    Vomiting blood, or large amounts of blood in stool    Seizure  Date Last Reviewed: 4/1/2017 2000-2018 The  [employer]. [address] [place] [address], [place] [address]. All rights reserved. This information is not intended as a substitute for professional medical care. Always follow your healthcare professional's instructions.          Paraesthesias  Paraesthesia is a burning or prickling sensation that is sometimes felt in the hands, arms, legs or feet. It can also occur in other parts of the body. It can also feel like tingling or numbness, skin crawling, or itching. The feeling is not comfortable, but it is not painful. (The \"pins and needles\" feeling that happens when a foot or hand \"falls asleep\" is a temporary paraesthesia.)  Paraesthesias that last or come and go may be caused by medical issues that need to be treated. These include stroke, a bulging disk pressing on a nerve, a trapped nerve, vitamin deficiencies, uncontrolled diabetes, alcohol abuse, or even certain medicines.  Tests are often done. These tests may include blood tests, X-ray, CT (computerized tomography) scan, nerve conduction studies (NCS), or a muscle test (electromyography). Depending on the cause, treatment may include physical therapy.  Home care    Tell your healthcare provider about all medicines you take. This includes prescription and over-the-counter medicines, vitamins, and herbs. Ask if any of the medicines may be causing your problems. Don't make any changes to prescription medicines without talking to your healthcare provider first.    You may be prescribed medicines to help relieve the tingling feeling or for pain. Take all medicines as directed.    A numb hand or foot may be more prone to injury. To help protect it:  ? Always use oven mitts.  ? Test water with an unaffected hand or foot.  ? Use caution when trimming nails. File sharp areas.  ? Wear shoes that fit well to avoid pressure points, blisters, and ulcers.  ? Inspect your hands and feet carefully (including the soles of your feet and between your toes) daily. If you " see red areas, sores, or other problems, tell your healthcare provider.  Follow-up care  Follow up with your doctor, or as advised. You may need further testing or evaluation.     When to seek medical advice  Call your healthcare provider right away if any of the following occur:    Numbness or weakness of the face, one arm, or one leg    Slurred speech, confusion, trouble speaking, walking, or seeing    Severe headache, fainting spell, dizziness, or seizure    Chest, arm, neck, or upper back pain    Loss of bladder or bowel control    Open wound with redness, swelling, or pus         Date Last Reviewed: 4/1/2018 2000-2018 [employer]. [address] [place] [address]. All rights reserved. This information is not intended as a substitute for professional medical care. Always follow your healthcare professional's instructions.          Hyponatremia  Hyponatremia means low sodium levels in the blood. This condition most often occurs after prolonged vomiting or diarrhea, which causes your body to lose too much water and sodium. It can also result from drinking excess amounts of water or the use of diuretics (water pills). Rarely, it can be associated with disorders of your endocrine system, as side effects of illicit drug use (ecstasy), as a complication of some cancers especially small cell lung cancer, or as a complication of renal and liver disease or heart failure.  Mild hyponatremia causes no symptoms. It is only discovered with a blood test. As sodium levels in the blood decreases, symptoms begin to appear. This includes weakness, confusion, muscle cramping and seizures.  Home care    Reduce your daily water intake until the problem is corrected.    If you have been taking diuretics, you may be asked to stop taking them for a short time.    If you are having symptoms of weakness or confusion, do not drive or operate dangerous machinery until symptoms resolve.    If your sodium levels are  too low to be managed at home with the above recommendations, you will be asked to go to the hospital to have your sodium replaced through your vein.  Follow-up care  Follow up with your healthcare provider for a repeat blood test within the next week, or as advised.  When to seek medical advice  Call your healthcare provider if any of the following occur:    Increasing weakness    Dizziness    Irregular heartbeat, extra beats or very fast heart rate    Increasing confusion    Fainting or loss of consciousness    Seizure  Date Last Reviewed: 7/1/2017 2000-2018 [employer]. [address] [place] [address]. All rights reserved. This information is not intended as a substitute for professional medical care. Always follow your healthcare professional's instructions.          24 Hour Appointment Hotline       To make an appointment at any [facility], call [phone] ([phone]). If you don't have a family doctor or clinic, we will help you find one. [facility] clinics are conveniently located to serve the needs of you and your family.             Review of your medicines      Our records show that you are taking the medicines listed below. If these are incorrect, please call your family doctor or clinic.        Dose / Directions Last dose taken    ciprofloxacin 500 MG tablet   Commonly known as:  CIPRO   Dose:  500 mg   Quantity:  6 tablet        Take 1 tablet (500 mg) by mouth 2 times daily   Refills:  0        mometasone 0.1 % cream   Commonly known as:  ELOCON   Quantity:  15 g        Apply topically daily as needed   Refills:  1        sertraline 50 MG tablet   Commonly known as:  ZOLOFT   Quantity:  60 tablet        Take 1/2 tablet (25 mg) for 1-2 weeks, then increase to 1 tablet orally daily for 1-2 weeks, then increase to 2 tablets daily   Refills:  1        valACYclovir 500 MG tablet   Commonly known as:  VALTREX   Quantity:  90 tablet        TAKE 1 TABLET(500 MG) BY MOUTH  DAILY   Refills:  1                Procedures and tests performed during your visit     CBC with platelets differential    CK total    Cardiac Continuous Monitoring    Comprehensive metabolic panel    EKG 12-lead, tracing only    Peripheral IV: Standard    Pulse oximetry nursing    Troponin I    UA with Microscopic      Orders Needing Specimen Collection     None      Pending Results     Date and Time Order Name Status Description    11/9/2018 2245 EKG 12-lead, tracing only Preliminary             Pending Culture Results     No orders found for last 3 day(s).            Pending Results Instructions     If you had any lab results that were not finalized at the time of your Discharge, you can call the ED Lab Result RN at [phone]. You will be contacted by this team for any positive Lab results or changes in treatment. The nurses are available 7 days a week from 10A to 6:30P.  You can leave a message 24 hours per day and they will return your call.        Test Results From Your Hospital Stay        11/9/2018 11:27 PM      Component Results     Component Value Ref Range & Units Status    WBC 8.5 4.0 - 11.0 10e9/L Final    RBC Count 4.88 4.4 - 5.9 10e12/L Final    Hemoglobin 14.6 13.3 - 17.7 g/dL Final    Hematocrit 42.1 40.0 - 53.0 % Final    MCV 86 78 - 100 fl Final    MCH 29.9 26.5 - 33.0 pg Final    MCHC 34.7 31.5 - 36.5 g/dL Final    RDW 11.2 10.0 - 15.0 % Final    Platelet Count 231 150 - 450 10e9/L Final    Diff Method Automated Method  Final    % Neutrophils 66.4 % Final    % Lymphocytes 22.5 % Final    % Monocytes 9.7 % Final    % Eosinophils 0.7 % Final    % Basophils 0.5 % Final    % Immature Granulocytes 0.2 % Final    Nucleated RBCs 0 0 /100 Final    Absolute Neutrophil 5.7 1.6 - 8.3 10e9/L Final    Absolute Lymphocytes 1.9 0.8 - 5.3 10e9/L Final    Absolute Monocytes 0.8 0.0 - 1.3 10e9/L Final    Absolute Eosinophils 0.1 0.0 - 0.7 10e9/L Final    Absolute Basophils 0.0 0.0 - 0.2 10e9/L Final    Abs  Immature Granulocytes 0.0 0 - 0.4 10e9/L Final    Absolute Nucleated RBC 0.0  Final         11/9/2018 11:45 PM      Component Results     Component Value Ref Range & Units Status    Troponin I ES <0.015 0.000 - 0.045 ug/L Final    The 99th percentile for upper reference range is 0.045 ug/L.  Troponin values   in the range of 0.045 - 0.120 ug/L may be associated with risks of adverse   clinical events.           11/9/2018 11:45 PM      Component Results     Component Value Ref Range & Units Status    Sodium 127 (L) 133 - 144 mmol/L Final    Potassium 3.7 3.4 - 5.3 mmol/L Final    Chloride 95 94 - 109 mmol/L Final    Carbon Dioxide 24 20 - 32 mmol/L Final    Anion Gap 8 3 - 14 mmol/L Final    Glucose 91 70 - 99 mg/dL Final    Urea Nitrogen 6 (L) 7 - 30 mg/dL Final    Creatinine 0.86 0.66 - 1.25 mg/dL Final    GFR Estimate >90 >60 mL/min/1.7m2 Final    Non  GFR Calc    GFR Estimate If Black >90 >60 mL/min/1.7m2 Final    African American GFR Calc    Calcium 8.7 8.5 - 10.1 mg/dL Final    Bilirubin Total 1.1 0.2 - 1.3 mg/dL Final    Albumin 4.6 3.4 - 5.0 g/dL Final    Protein Total 8.0 6.8 - 8.8 g/dL Final    Alkaline Phosphatase 59 40 - 150 U/L Final    ALT 27 0 - 70 U/L Final    AST 22 0 - 45 U/L Final         11/9/2018 11:45 PM      Component Results     Component Value Ref Range & Units Status    CK Total 200 30 - 300 U/L Final         11/9/2018 11:30 PM      Component Results     Component Value Ref Range & Units Status    Color Urine Straw  Final    Appearance Urine Clear  Final    Glucose Urine Negative NEG^Negative mg/dL Final    Bilirubin Urine Negative NEG^Negative Final    Ketones Urine 5 (A) NEG^Negative mg/dL Final    Specific Gravity Urine 1.002 (L) 1.003 - 1.035 Final    Blood Urine Negative NEG^Negative Final    pH Urine 6.0 5.0 - 7.0 pH Final    Protein Albumin Urine Negative NEG^Negative mg/dL Final    Urobilinogen mg/dL 0.0 0.0 - 2.0 mg/dL Final    Nitrite Urine Negative NEG^Negative  Final    Leukocyte Esterase Urine Negative NEG^Negative Final    Source Midstream Urine  Final    WBC Urine 0 0 - 5 /HPF Final    RBC Urine 0 0 - 2 /HPF Final                Clinical Quality Measure: Blood Pressure Screening     Your blood pressure was checked while you were in the emergency department today. The last reading we obtained was  BP: (!) 133/93 . Please read the guidelines below about what these numbers mean and what you should do about them.  If your systolic blood pressure (the top number) is less than 120 and your diastolic blood pressure (the bottom number) is less than 80, then your blood pressure is normal. There is nothing more that you need to do about it.  If your systolic blood pressure (the top number) is 120-139 or your diastolic blood pressure (the bottom number) is 80-89, your blood pressure may be higher than it should be. You should have your blood pressure rechecked within a year by a primary care provider.  If your systolic blood pressure (the top number) is 140 or greater or your diastolic blood pressure (the bottom number) is 90 or greater, you may have high blood pressure. High blood pressure is treatable, but if left untreated over time it can put you at risk for heart attack, stroke, or kidney failure. You should have your blood pressure rechecked by a primary care provider within the next 4 weeks.  If your provider in the emergency department today gave you specific instructions to follow-up with your doctor or provider even sooner than that, you should follow that instruction and not wait for up to 4 weeks for your follow-up visit.        Thank you for choosing [facility]       Thank you for choosing [facility] for your care. Our goal is always to provide you with excellent care. Hearing back from our patients is one way we can continue to improve our services. Please take a few minutes to complete the written survey that you may receive in the mail after you visit with us. Thank  "you!        [employer] Information     [employer] lets you send messages to your doctor, view your test results, renew your prescriptions, schedule appointments and more. To sign up, go to [email or web address] . Click on \"Log in\" on the left side of the screen, which will take you to the Welcome page. Then click on \"Sign up Now\" on the right side of the page.     You will be asked to enter the access code listed below, as well as some personal information. Please follow the directions to create your username and password.     Your access code is: [email or web address]  Expires: 2019 10:02 AM     Your access code will  in 90 days. If you need help or a new code, please call your [place] clinic or [phone].        Care EveryWhere ID     This is your Care EveryWhere ID. This could be used by other organizations to access your [place] medical records  [id number]        Equal Access to Services     BULMARO GARY : Hadii luis connero Somelissa, waaxda luqadaha, qaybta kaalmada adeflorecitayacarol, melissa ellis . So [facility] [phone].    ATENCIÓN: Si habla español, tiene a aly disposición servicios gratuitos de asistencia lingüística. Llame al [phone].    We comply with applicable federal civil rights laws and [place] laws. We do not discriminate on the basis of race, color, national origin, age, disability, sex, sexual orientation, or gender identity.            After Visit Summary       This is your record. Keep this with you and show to your community pharmacist(s) and doctor(s) at your next visit.                  "

## 2018-11-09 NOTE — PROGRESS NOTES
"  SUBJECTIVE:   Gagan Herrera is a 38 year old male who presents to clinic today for the following health issues:    ED/UC Followup:    Facility:  [facility]  Date of visit: 11/7/18  Reason for visit: dysuria- was given cipro antibiotic rx  Current Status: Patient states he felt symptoms were worsening but today feels a little better. Still taking cipro, wondering if he needs a different antibiotic, has been drinking cranberry       ROS: const/gi/gu/msk otherwise negative     OBJECTIVE:  /64 (BP Location: Right arm, Cuff Size: Adult Large)  Pulse 91  Temp 97.4  F (36.3  C) (Tympanic)  Ht 5' 9\" (1.753 m)  Wt 175 lb (79.4 kg)  SpO2 98%  BMI 25.84 kg/m2  CONSTITUTIONAL: Alert, well-nourished, well-groomed, NAD  RESP: Lungs CTA. No wheeze, rhonchi, rales.  CV: HRRR S1 S2 No MRG. No peripheral edema  GI: Abdomen flat. BS x 4. No TTP. No HSM or masses. No CVAT  PSYCH: Bright affect. Appropriate mentation and speech.       ASSESSMENT/PLAN:  (R30.0) Dysuria  Comment: 1st UTI 2016 (proteus). 2nd UTI  8/2017 (e-coli) tx with bactrim, then switched to Rocephin + cipro due to Bactrim resistance. Still having WBCs in urine at f/u so was given cipro and referred to Dr. Poon of urology. With urology, he had a normal cystoscopy and renal US but semen grew enterococcus.  Was then started on Augmentin.  The assessment from Dr. Poon was that he had bacterial prostatitis, which was likely the source of the e-coli cystitis as well.  He had full resolution of sx and was told, if he had sx again, he should get a semen culture to guide tx prior to starting any therapy.        This episode started less than a week ago. Patient with several days of dysuria so was seen in UC. UA was negative but given his history he was started on 7 days of cipro, which has improved his sx but not completely resolved them.     I spoke with Dr. Poon. He was disappointed that the patient did not remember the plan to do a semen culture " prior to abx. Since he is already on abx he should continue, recommended extending from 7 day to 10 full days. If no improvement, see urology. If he does improve and then sx recur, needs to schedule semen sample with Dr. Poon's office.   Plan: UROLOGY ADULT REFERRAL, ciprofloxacin (CIPRO)         500 MG tablet          -Reviewed r/b/se of med  -Discussed recommendations with pt.         Samantha Orozco, FNP-DNP.

## 2018-11-09 NOTE — TELEPHONE ENCOUNTER
Please call patient.    I spoke with Dr. Poon.    Should continue Cipro. Take 10 days total. I sent a script for an extra 3 days to his pharmacy.     If sx don't go away let me know.    If they do go away and they come back, need to contact DR. Poon's office right away to leave semen sample.

## 2018-11-09 NOTE — ED AVS SNAPSHOT
[facility] Emergency Department    [address]    [facility] [address]    Phone:  [phone]    Fax:  [phone]                                       Gagan Herrera   MRN: [id number]    Department:  [facility] Emergency Department   Date of Visit:  11/9/2018           After Visit Summary Signature Page     I have received my discharge instructions, and my questions have been answered. I have discussed any challenges I see with this plan with the nurse or doctor.    ..........................................................................................................................................  Patient/Patient Representative Signature      ..........................................................................................................................................  Patient Representative Print Name and Relationship to Patient    ..................................................               ................................................  Date                                   Time    ..........................................................................................................................................  Reviewed by Signature/Title    ...................................................              ..............................................  Date                                               Time          22EPIC Rev 08/18

## 2018-11-09 NOTE — TELEPHONE ENCOUNTER
Reason for Call/Nurse Assessment:  37 y/o male calls about continued UTI symptoms despite being on Cipro. He states first dose was Wednesday night, at the 72 hour allison, but he continues with frequency and sense of bladder full but he is able to urinate, denies fever.     RN Action/Disposiiton:  Reviewed protocols, advised pt that recommendation is between Home Care and a follow up, continue with hydration and antibiotic, but make appointment so that if symptoms still there in the morning he can follow up with PCP, maybe he needs something other than Cipro based off of cultures. Transferred him to scheduling for appointment    Earline Vigil RN - [place] Nurse Advisor  11/09/2018   1:56AM    Additional Information    [1] Taking antibiotic < 72 hours (3 days) for UTI AND [2] painful urination or frequency not improved   (all triage questions negative)    Negative: Diabetes mellitus or weak immune system (e.g., HIV positive, cancer chemotherapy, transplant patient)    Negative: Sickle cell disease    Negative: [1] Taking antibiotic < 24 hours for UTI AND [2] fever persists   (all triage questions negative)    Negative: [1] Taking antibiotic > 72 hours (3 days) for UTI AND [2] painful urination or frequency not improved    Negative: [1] Taking antibiotic > 72 hours (3 days) for UTI AND [2] pain (e.g., flank, scrotum) not improved    Negative: [1] Taking antibiotic > 72 hours (3 days) for UTI-STD AND [2] penile discharge (yellow or white pus) not improved    Protocols used: URINARY TRACT INFECTION ON ANTIBIOTIC FOLLOW-UP CALL - MALE-ADULT-  Additional Information    Negative: Shock suspected (e.g., cold/pale/clammy skin, too weak to stand, low BP, rapid pulse)    Negative: Sounds like a life-threatening emergency to the triager    Negative: Urinary tract infection suspected, but not taking antibiotics    Negative: Passing pure blood or large blood clots (i.e., size > a dime)  (Exceptions: flecks, small strands, or  pinkish-red color)    Negative: Patient sounds very sick or weak to the triager    Negative: [1] Unable to urinate (or only a few drops) > 4 hours AND     [2] bladder feels very full (e.g., palpable bladder or strong urge to urinate)    Negative: [1] SEVERE pain (e.g., excruciating) AND [2] no improvement 2 hours after pain medications    Negative: [1] Fever > 100.5 F (38.1 C) AND [2] new onset since starting antibiotics    Negative: [1] Side (flank) or lower back pain AND [2] new onset since starting antibiotics    Negative: [1] Taking antibiotic > 24 hours for UTI AND [2] flank or lower back pain worsening    Negative: [1] Vomiting 2 or more times AND [2] interferes with taking oral antibiotic    Negative: [1] Taking antibiotic > 24 hours for UTI AND [2] fever persists    Protocols used: URINARY TRACT INFECTION ON [facility]

## 2018-11-10 VITALS
RESPIRATION RATE: 16 BRPM | OXYGEN SATURATION: 99 % | DIASTOLIC BLOOD PRESSURE: 93 MMHG | SYSTOLIC BLOOD PRESSURE: 133 MMHG | WEIGHT: 176.37 LBS | BODY MASS INDEX: 26.12 KG/M2 | HEIGHT: 69 IN | TEMPERATURE: 97.7 F | HEART RATE: 76 BPM

## 2018-11-10 LAB — INTERPRETATION ECG - MUSE: NORMAL

## 2018-11-10 NOTE — ED PROVIDER NOTES
History     Chief Complaint:  Medication Reaction     HPI   Gagan Herrera is a 38 year old male with history of anxiety and prostatitis who presents to the emergency department today for evaluation of medication reaction. Patient states he had symptoms of urgency on 11/7 for which he visited , with normal urinalysis documented below, but was placed on Cipro. Since then, patient notes symptoms he believes to be secondary to Cipro administration (fifth dose earlier tonight at 1900) including diffused body tingling, hot/cold flashes, mild shortness of breath, chest tightness, dizziness, nausea, right shoulder tension, neck tension, and intermittent headache. In addition, patient asserts exacerbation of anxiety last night (alleviated by Xanax) and this evening, patient notes this is a chronic issue for him but attributes potential exacerbation due to Cipro. Patient highlights he took his fifth dose around 1900 this evening. Patient denies any emesis.     UA on 11/7      Allergies:  No Known Drug Allergies    Medications:    Valtrex  Cipro   Zoloft     Past Medical History:    STD  Anxiety  Moderate major depression    Past Surgical History:    Appendectomy  Sinus surgery     Family History:    Maternal grandmother: breast cancer  Maternal grandfather: myocardial infarction   Paternal grandfather: COPD    Social History:  Smoking Status: Former Smoker   Types: Cigarettes   Quit date: 2/17/2013  Smokeless Tobacco: Former User   Quit date: 10/15/2013  Alcohol Use: Positive  Marital Status: Single     Review of Systems   Respiratory: Positive for chest tightness and shortness of breath.    Gastrointestinal: Positive for nausea. Negative for vomiting.   Musculoskeletal:        Right shoulder and neck tension   Neurological: Positive for dizziness and headaches.   Psychiatric/Behavioral: The patient is nervous/anxious.    All other systems reviewed and are negative.    Physical Exam     Patient Vitals for the past 24  "hrs:   BP Temp Temp src Pulse Resp SpO2 Height Weight   11/10/18 0021 - - - - 16 - - -   11/09/18 2217 (!) 133/93 97.7  F (36.5  C) Temporal 76 18 99 % 1.753 m (5' 9\") 80 kg (176 lb 5.9 oz)     Physical Exam  Constitutional:  Oriented to person, place, and time. Anxious.  HENT:   Head:    Normocephalic.   Mouth/Throat:   Oropharynx is clear and moist.   Eyes:    EOM are normal. Pupils are equal, round, and reactive to light.   Neck:    Neck supple.   Cardiovascular:  Normal rate, regular rhythm and normal heart sounds.      Exam reveals no gallop and no friction rub.       No murmur heard.  Pulmonary/Chest:  Effort normal and breath sounds normal.      No respiratory distress. No wheezes. No rales.      No reproducible chest wall pain.  Abdominal:   Soft. No distension. No tenderness. No rebound and no guarding.   Musculoskeletal:  Normal range of motion.   Neurological:   Alert and oriented to person, place, and time.           Moves all 4 extremities spontaneously    Skin:    No rash noted. No pallor.     Emergency Department Course     ECG:  ECG taken at 2249, ECG read at 2249  Normal sinus rhythm  Normal ECG  Rate 73 bpm. DE interval 118 ms. QRS duration 102 ms. QT/QTc 390/429 ms. P-R-T axes 30 29 23.    Laboratory:  Laboratory findings were communicated with the patient who voiced understanding of the findings.    CBC: WBC 8.5, HGB 14.6,   Troponin (Collected 2317): <0.015  CMP: Na 127 (L), BUN 6 (L) o/w WNL (Creatinine 0.86)  CK total: 200  UA: Specific gravity 1.002 (L) o/w WNL      Interventions:  2317 LR 1L IV     Emergency Department Course:    2233 Nursing notes and vitals reviewed.    2236 I performed an exam of the patient as documented above.     2317 IV was inserted and blood was drawn for laboratory testing, results above.    2317 The patient provided a urine sample here in the emergency department. This was sent for laboratory testing, findings above.    0006 Recheck and update.     I " personally reviewed the laboratory results with the patient and answered all related questions prior to discharge.    Impression & Plan      Medical Decision Making:  Gagan Herrera is a 38 year old male that came in complaining of anxiety, atypical chest pain, paraesthesias after being on Cipro for 2 days for possible prostatitis. Differential includes anxiety, myalgias, tendinitis, and/or other adverse reaction to ciprofloxacin. He had a negative work up as seen. I did end up getting an EKG, lab workup screening for ACS, I feel like this was quite unlikely. Lab work up is otherwise nonspecific. He does have mildly low sodium, not at a dangerous level. This is unlikely to be due to ciprofloxacin. He did recently start taking Zoloft over the past two weeks which is known to have adverse reaction of SIADH or hyponatremia and certainly could cause paresthesias. I did discuss discontinuing this medication and may need alternative medication for anxiety. I am unsure of the relation of the ciprofloxacin and causing patient's vague symptoms at this time. There was no real objective findings for UTI and prostatitis per patient that was initially prescribed ciprofloxacin. I did discuss patient's has the option to discontinue medication vs continuing and closely following up with his primary doctor. He should stop should his paresthesias or muscle pains worsen, told to return for any new or worsening concerns. Discharged home, follow up with PMD or urologist.     Diagnosis:    ICD-10-CM   1. Anxiety F41.9   2. Atypical chest pain R07.89   3. Paresthesia R20.2   4. Adverse reaction to antibiotic T36.95XA     Disposition:   The patient is discharged to home.    Scribe Disclosure:  DARRYN, Elie Last, am serving as a scribe at 11:02 PM on 11/9/2018 to document services personally performed by Yonis Hogan MD based on my observations and the provider's statements to me.    [facility] EMERGENCY DEPARTMENT        Yonis Hogan MD  11/10/18 0246

## 2018-11-10 NOTE — ED TRIAGE NOTES
Pt believing he is having medication reaction to cipro.  Feeling of pins and needles feeling all over as well as rt shoulder discomfort.  Also complaint of dizziness and associated nausea.

## 2018-11-10 NOTE — TELEPHONE ENCOUNTER
"Caller states he has been on Cipro for  5 days and fear he is having side effects; states that 2 hrs after taking he feels  Tightness in chest and dizziness and sometimes feels joint stiffness; non of these symptoms persist for any length of time  and last noticed these yesterday  Triagae protocl and Micro Medex reviewed   States he has read all the  side effects associated with ciprofloxin and has some fear of  developing serious problems from it   Assured that serious side effects are rare and that  symptoms persist and worsen rather than come and go  Advised to continue taking medication and call for any new worsening or persisting symptoms   Understands and will comply   Eli Ramesh RN  FNA    Additional Information    Negative: Drug overdose and nurse unable to answer question    Negative: Caller requesting information not related to medicine    Negative: Caller requesting a prescription for Strep throat and has a positive culture result    Negative: Rash while taking a medication or within 3 days of stopping it    Negative: Immunization reaction suspected    Negative: [1] Asthma and [2] having symptoms of asthma (cough, wheezing, etc)    Negative: MORE THAN A DOUBLE DOSE of a prescription or over-the-counter (OTC) drug    Negative: [1] DOUBLE DOSE (an extra dose or lesser amount) of over-the-counter (OTC) drug AND [2] any symptoms (e.g., dizziness, nausea, pain, sleepiness)    Negative: [1] DOUBLE DOSE (an extra dose or lesser amount) of prescription drug AND [2] any symptoms (e.g., dizziness, nausea, pain, sleepiness)    Negative: Took another person's prescription drug    Negative: [1] DOUBLE DOSE (an extra dose or lesser amount) of prescription drug AND [2] NO symptoms (Exception: a double dose of antibiotics)    Negative: Diabetes drug error or overdose (e.g., insulin or extra dose)    Negative: [1] Request for URGENT new prescription or refill of \"essential\" medication (i.e., likelihood of harm to " patient if not taken) AND [2] triager unable to fill per unit policy    Negative: [1] Prescription not at pharmacy AND [2] was prescribed today by PCP    Negative: Pharmacy calling with prescription questions and triager unable to answer question    Negative: Caller has URGENT medication question about med that PCP prescribed and triager unable to answer question    Caller has medication question only, adult not sick, and triager answers question    Protocols used: MEDICATION QUESTION CALL-ADULT-

## 2018-11-10 NOTE — DISCHARGE INSTRUCTIONS
Your Zoloft may be causing your low sodium. Please follow up with your doctor on starting alternative medication to Zoloft     Discharge Instructions  Chest Pain    You have been seen today for chest pain or discomfort.  At this time, your doctor has found no signs that your chest pain is due to a serious or life-threatening condition, (or you have declined more testing and/or admission to the hospital). However, sometimes there is a serious problem that does not show up right away. Your evaluation today may not be complete and you may need further testing and evaluation.     You need to follow-up with your regular doctor within 3 days.    Return to the Emergency Department if:    Your chest pain changes, gets worse, starts to happen more often, or comes with less activity.    You are short of breath.    You get very weak or tired.    You pass out or faint.    You have any new symptoms, like fever, cough, numb legs, or you cough up blood.    You have anything else that worries you.    Until you follow-up with your regular doctor please do the following:    Take one aspirin daily unless you have an allergy or are told not to by your doctor.    If a stress test appointment has been made, go to the appointment.    If you have questions, contact your regular doctor.    If your doctor today has told you to follow-up with your regular doctor, it is very important that you make an appointment with your clinic and go to the appointment.  If you do not follow-up with your primary doctor, it may result in missing an important development which could result in permanent injury or disability and/or lasting pain.  If there is any problem keeping your appointment, call your doctor or return to the Emergency Department.    If you were given a prescription for medicine here today, be sure to read all of the information (including the package insert) that comes with your prescription.  This will include important information about  the medicine, its side effects, and any warnings that you need to know about.  The pharmacist who fills the prescription can provide more information and answer questions you may have about the medicine.  If you have questions or concerns that the pharmacist cannot address, please call or return to the Emergency Department.     Opioid Medication Information    Pain medications are among the most commonly prescribed medicines, so we are including this information for all our patients. If you did not receive pain medication or get a prescription for pain medicine, you can ignore it.     You may have been given a prescription for an opioid (narcotic) pain medicine and/or have received a pain medicine while here in the Emergency Department. These medicines can make you drowsy or impaired. You must not drive, operate dangerous equipment, or engage in any other dangerous activities while taking these medications. If you drive while taking these medications, you could be arrested for DUI, or driving under the influence. Do not drink any alcohol while you are taking these medications.     Opioid pain medications can cause addiction. If you have a history of chemical dependency of any type, you are at a higher risk of becoming addicted to pain medications.  Only take these prescribed medications to treat your pain when all other options have been tried. Take it for as short a time and as few doses as possible. Store your pain pills in a secure place, as they are frequently stolen and provide a dangerous opportunity for children or visitors in your house to start abusing these powerful medications. We will not replace any lost or stolen medicine.  As soon as your pain is better, you should flush all your remaining medication.     Many prescription pain medications contain Tylenol  (acetaminophen), including Vicodin , Tylenol #3 , Norco , Lortab , and Percocet .  You should not take any extra pills of Tylenol  if you are using  these prescription medications or you can get very sick.  Do not ever take more than 3000 mg of acetaminophen in any 24 hour period.    All opioids tend to cause constipation. Drink plenty of water and eat foods that have a lot of fiber, such as fruits, vegetables, prune juice, apple juice and high fiber cereal.  Take a laxative if you don t move your bowels at least every other day. Miralax , Milk of Magnesia, Colace , or Senna  can be used to keep you regular.      Remember that you can always come back to the Emergency Department if you are not able to see your regular doctor in the amount of time listed above, if you get any new symptoms, or if there is anything that worries you.          Reaction to Medicine (Other Type)  You are having a reaction to a medicine you have taken. This may not be the same as an allergic reaction. It is an undesired, unfavorable reaction, or a side effect of a medicine. This can cause a variety of symptoms, including:    Dizziness or headache    Rash    Flushing or hot sensation    Nausea, vomiting, or stomach pain    Diarrhea or constipation    Trouble breathing    High or low blood pressure  A reaction can be an upset stomach from something like aspirin or ibuprofen, feeling faint after taking a blood pressure medicine, feeling anxious, and many other things. Symptoms of a medicine reaction can range from very mild to very severe.  In most cases, the reaction goes away within 1 to 12 hours. But it will probably occur again if you take this same medicine. Your healthcare provider will advise you whether to change how much, when, or how often you take this medicine. He or she may also advise you to discontinue this medicine or switch to another one.   Home care    Another medicine may be recommended to reduce your symptoms until the medicine s effect wears off. Follow your healthcare provider s advice.    When the medicine s effect has worn off, there should be no further problem as  "long as you don't take the same medicine again.     Ask your healthcare provider if you should also avoid similar medicines. Write down the information so you will remember it.    Make certain the medicine reaction is documented in your medical record.  Follow-up care  Follow up with your healthcare provider, or as advised if your symptoms are not better within 24 hours.  When to seek medical advice  Call your healthcare provider right away if any of these occur.    New symptoms that concern you    Worsening of your current symptoms, including rash or facial swelling    Symptoms that are not relieved by the treatment advised    Fever of 100.4 F (38 C) or higher, or as advised by your healthcare provider  Call 911  Call 911 if any of these occur:    Trouble breathing or swallowing, or wheezing    Hoarse voice or trouble speaking    Confusion    Extreme drowsiness or trouble awakening    Fainting or loss of consciousness    Rapid heart rate or slow heart rate    Very low or very high blood pressure    Vomiting blood, or large amounts of blood in stool    Seizure  Date Last Reviewed: 4/1/2017 2000-2018 [employer]. [address] [place] [address]. All rights reserved. This information is not intended as a substitute for professional medical care. Always follow your healthcare professional's instructions.          Paraesthesias  Paraesthesia is a burning or prickling sensation that is sometimes felt in the hands, arms, legs or feet. It can also occur in other parts of the body. It can also feel like tingling or numbness, skin crawling, or itching. The feeling is not comfortable, but it is not painful. (The \"pins and needles\" feeling that happens when a foot or hand \"falls asleep\" is a temporary paraesthesia.)  Paraesthesias that last or come and go may be caused by medical issues that need to be treated. These include stroke, a bulging disk pressing on a nerve, a trapped nerve, vitamin " deficiencies, uncontrolled diabetes, alcohol abuse, or even certain medicines.  Tests are often done. These tests may include blood tests, X-ray, CT (computerized tomography) scan, nerve conduction studies (NCS), or a muscle test (electromyography). Depending on the cause, treatment may include physical therapy.  Home care    Tell your healthcare provider about all medicines you take. This includes prescription and over-the-counter medicines, vitamins, and herbs. Ask if any of the medicines may be causing your problems. Don't make any changes to prescription medicines without talking to your healthcare provider first.    You may be prescribed medicines to help relieve the tingling feeling or for pain. Take all medicines as directed.    A numb hand or foot may be more prone to injury. To help protect it:  ? Always use oven mitts.  ? Test water with an unaffected hand or foot.  ? Use caution when trimming nails. File sharp areas.  ? Wear shoes that fit well to avoid pressure points, blisters, and ulcers.  ? Inspect your hands and feet carefully (including the soles of your feet and between your toes) daily. If you see red areas, sores, or other problems, tell your healthcare provider.  Follow-up care  Follow up with your doctor, or as advised. You may need further testing or evaluation.     When to seek medical advice  Call your healthcare provider right away if any of the following occur:    Numbness or weakness of the face, one arm, or one leg    Slurred speech, confusion, trouble speaking, walking, or seeing    Severe headache, fainting spell, dizziness, or seizure    Chest, arm, neck, or upper back pain    Loss of bladder or bowel control    Open wound with redness, swelling, or pus         Date Last Reviewed: 4/1/2018 2000-2018 [employer]. [address] [place] [id number]. All rights reserved. This information is not intended as a substitute for professional medical care. Always follow  your healthcare professional's instructions.          Hyponatremia  Hyponatremia means low sodium levels in the blood. This condition most often occurs after prolonged vomiting or diarrhea, which causes your body to lose too much water and sodium. It can also result from drinking excess amounts of water or the use of diuretics (water pills). Rarely, it can be associated with disorders of your endocrine system, as side effects of illicit drug use (ecstasy), as a complication of some cancers especially small cell lung cancer, or as a complication of renal and liver disease or heart failure.  Mild hyponatremia causes no symptoms. It is only discovered with a blood test. As sodium levels in the blood decreases, symptoms begin to appear. This includes weakness, confusion, muscle cramping and seizures.  Home care    Reduce your daily water intake until the problem is corrected.    If you have been taking diuretics, you may be asked to stop taking them for a short time.    If you are having symptoms of weakness or confusion, do not drive or operate dangerous machinery until symptoms resolve.    If your sodium levels are too low to be managed at home with the above recommendations, you will be asked to go to the hospital to have your sodium replaced through your vein.  Follow-up care  Follow up with your healthcare provider for a repeat blood test within the next week, or as advised.  When to seek medical advice  Call your healthcare provider if any of the following occur:    Increasing weakness    Dizziness    Irregular heartbeat, extra beats or very fast heart rate    Increasing confusion    Fainting or loss of consciousness    Seizure  Date Last Reviewed: 7/1/2017 2000-2018 [employer]. [address] [place] [id number]. All rights reserved. This information is not intended as a substitute for professional medical care. Always follow your healthcare professional's instructions.

## 2018-11-10 NOTE — TELEPHONE ENCOUNTER
"Spouse calling who was not with pt, unable to triage at that time.      Writer contacted pt at [phone].  Pt describes feeling a tingling sensation in his body, dizziness, diarrhea, and weakness.  He also reports SOB but thinks this is his anxiety as it is no different than his baseline.  Pt concerned that the medication he is taking could be making him feel this way.  Spouse states pt did not notify the physician about these symptoms when he was seen in clinic today.     Disposition:  See a provider within 4 hrs.  Pt verbalized understanding and had no further questions.     Chantel Valenzuela RN/MIGUELINA    Reason for Disposition    [1] MODERATE weakness (i.e., interferes with work, school, normal activities) AND [2] cause unknown  (Exceptions: weakness with acute minor illness, or weakness from poor fluid intake)    Additional Information    Negative: Severe difficulty breathing (e.g., struggling for each breath, speaks in single words)    Negative: Shock suspected (e.g., cold/pale/clammy skin, too weak to stand, low BP, rapid pulse)    Negative: Difficult to awaken or acting confused  (e.g., disoriented, slurred speech)    Negative: [1] Fainted > 15 minutes ago AND [2] still feels too weak or dizzy to stand    Negative: [1] SEVERE weakness (i.e., unable to walk or barely able to walk, requires support) AND     [2] new onset or worsening    Negative: Sounds like a life-threatening emergency to the triager    Negative: Weakness of the face, arm or leg on one side of the body    Negative: [1] Known diabetic AND [2] weakness from low blood sugar (i.e., < 60 mg/dl or 3.5 mmol/l)    Negative: Heat exhaustion suspected (i.e., dehydration from heat exposure)    Negative: Vomiting is main symptom    Negative: Diarrhea is main symptom    Negative: Difficulty breathing    Negative: Heart beating < 50 beats per minute OR > 140 beats per minute    Negative: Extra heart beats OR irregular heart beating   (i.e., \"palpitations\")    " Negative: Follows bleeding (e.g., from vomiting, rectum, vagina; EXCEPTION: small brief weakness from sight of a small amount blood)    Negative: Black or tarry bowel movements    Negative: [1] Drinking very little AND [2] dehydration suspected (e.g., no urine > 12 hours, very dry mouth, very lightheaded)    Negative: Patient sounds very sick or weak to the triager    Protocols used: WEAKNESS (GENERALIZED) AND FATIGUE-ADULT-AH

## 2019-02-15 DIAGNOSIS — F32.1 MODERATE MAJOR DEPRESSION (H): ICD-10-CM

## 2019-02-15 DIAGNOSIS — F41.9 ANXIETY: ICD-10-CM

## 2019-02-15 NOTE — TELEPHONE ENCOUNTER
"Requested Prescriptions   Pending Prescriptions Disp Refills     sertraline (ZOLOFT) 50 MG tablet [Pharmacy Med Name: SERTRALINE HCL 50 MG TABLET]    Last Written Prescription Date:  10/25/2018  Last Fill Quantity: 60,  # refills: 1   Last office visit: 11/9/2018 with prescribing provider:  Cherie Magana     Future Office Visit:   Next 5 appointments (look out 90 days)    Mar 07, 2019  8:00 AM CST  PH[facility] [address] [place] [address]  [address]  [facility] [address] [phone]          60 tablet 0     Sig: TAKE 1/2 TAB ORALLY DAILY X 1-2 WKS THEN INCREASE TO 1 TAB DAILY X 1-2 WKS, THEN INCREASE TO 2 TABS    SSRIs Protocol Failed - 2/15/2019  1:27 AM       Failed - PHQ-9 score less than 5 in past 6 months    Please review last PHQ-9 score.     PHQ-9 SCORE 1/25/2018 2/21/2018 10/25/2018   PHQ-9 Total Score - - -   PHQ-9 Total Score 0 3 19     MALIK-7 SCORE 1/25/2018 2/21/2018 10/25/2018   Total Score 0 4 11                Passed - Medication is active on med list       Passed - Patient is age 18 or older       Passed - Recent (6 mo) or future (30 days) visit within the authorizing provider's specialty    Patient had office visit in the last 6 months or has a visit in the next 30 days with authorizing provider or within the authorizing provider's specialty.  See \"Patient Info\" tab in inbasket, or \"Choose Columns\" in Meds & Orders section of the refill encounter.                "

## 2019-02-19 NOTE — TELEPHONE ENCOUNTER
Routing refill request to provider for review/approval because:  Labs out of range:  PHQ-9 >5.     Please review and advise.     Coming for physical beginning of March.     Betzaida Lucas RN Flex

## 2019-02-20 NOTE — TELEPHONE ENCOUNTER
rx sent. Will recheck at [facility] Zac Magana MD  Internal Medicine/Pediatrics

## 2019-03-06 NOTE — PATIENT INSTRUCTIONS
Preventive Health Recommendations  Male Ages 40 to 49    Yearly exam:             See your health care provider every year in order to  o   Review health changes.   o   Discuss preventive care.    o   Review your medicines if your doctor has prescribed any.    You should be tested each year for STDs (sexually transmitted diseases) if you re at risk.     Have a cholesterol test every 5 years.     Have a colonoscopy (test for colon cancer) if someone in your family has had colon cancer or polyps before age 50.     After age 45, have a diabetes test (fasting glucose). If you are at risk for diabetes, you should have this test every 3 years.      Talk with your health care provider about whether or not a prostate cancer screening test (PSA) is right for you.    Shots: Get a flu shot each year. Get a tetanus shot every 10 years.     Nutrition:    Eat at least 5 servings of fruits and vegetables daily.     Eat whole-grain bread, whole-wheat pasta and brown rice instead of white grains and rice.     Get adequate Calcium and Vitamin D.     Lifestyle    Exercise for at least 150 minutes a week (30 minutes a day, 5 days a week). This will help you control your weight and prevent disease.     Limit alcohol to one drink per day.     No smoking.     Wear sunscreen to prevent skin cancer.     See your dentist every six months for an exam and cleaning.   --------------------------  1. Labs today: cholesterol, diabetes screen, liver and kidney function  2. Decrease sertraline to 50 mg - will take 4-6 weeks to get to steady levels  - follow-up if not improving  3. Put refills of valtrex on file

## 2019-03-06 NOTE — PROGRESS NOTES
"    SUBJECTIVE:   CC: Gagan Herrera is an 38 year old male who presents for preventive health visit.     Healthy Habits:    Do you get at least three servings of calcium containing foods daily (dairy, green leafy vegetables, etc.)? { :306505::\"yes\"}    Amount of exercise or daily activities, outside of work: { :193208}    Problems taking medications regularly { :016764::\"No\"}    Medication side effects: { :636767::\"No\"}    Have you had an eye exam in the past two years? { :316232}    Do you see a dentist twice per year? { :952029}    Do you have sleep apnea, excessive snoring or daytime drowsiness?{ :816597}  {Outside tests to abstract? :614453}    {additional problems to add (Optional):142481}    Today's PHQ-2 Score:   PHQ-2 (  [employer]) 2018   Q1: Little interest or pleasure in doing things 0 0   Q2: Feeling down, depressed or hopeless 0 0   PHQ-2 Score 0 0   Q1: Little interest or pleasure in doing things - Not at all   Q2: Feeling down, depressed or hopeless - Not at all   PHQ-2 Score - 0     {PHQ-2 LOOK IN ASSESSMENTS (Optional) :844256}  Abuse: Current or Past(Physical, Sexual or Emotional)- {YES/NO/NA:088180}  Do you feel safe in your environment? {YES/NO/NA:384123}    Social History     Tobacco Use     Smoking status: Former Smoker     Types: Cigarettes     Last attempt to quit: 2013     Years since quittin.0     Smokeless tobacco: Former User     Quit date: 10/15/2013   Substance Use Topics     Alcohol use: Yes     Alcohol/week: 0.0 oz     Comment: soc     If you drink alcohol do you typically have >3 drinks per day or >7 drinks per week? {ETOH :791423}                      Last PSA: No results found for: PSA    Reviewed orders with patient. Reviewed health maintenance and updated orders accordingly - {Yes/No:688168::\"Yes\"}  {Chronicprobdata (Optional):962413}    Reviewed and updated as needed this visit by clinical staff         Reviewed and updated as needed this visit by " "Provider        {HISTORY OPTIONS (Optional):077343}    ROS:  { :286516::\"CONSTITUTIONAL: NEGATIVE for fever, chills, change in weight\",\"INTEGUMENTARY/SKIN: NEGATIVE for worrisome rashes, moles or lesions\",\"EYES: NEGATIVE for vision changes or irritation\",\"ENT: NEGATIVE for ear, mouth and throat problems\",\"RESP: NEGATIVE for significant cough or SOB\",\"CV: NEGATIVE for chest pain, palpitations or peripheral edema\",\"GI: NEGATIVE for nausea, abdominal pain, heartburn, or change in bowel habits\",\" male: negative for dysuria, hematuria, decreased urinary stream, erectile dysfunction, urethral discharge\",\"MUSCULOSKELETAL: NEGATIVE for significant arthralgias or myalgia\",\"NEURO: NEGATIVE for weakness, dizziness or paresthesias\",\"PSYCHIATRIC: NEGATIVE for changes in mood or affect\"}    OBJECTIVE:   There were no vitals taken for this visit.  EXAM:  {Exam Choices:195728}    {Diagnostic Test Results (Optional):952854::\"Diagnostic Test Results:\",\"none \"}    ASSESSMENT/PLAN:   {Dia Picklist:039240}    COUNSELING:  {MALE COUNSELING MESSAGES:361177::\"Reviewed preventive health counseling, as reflected in patient instructions\"}    BP Readings from Last 1 Encounters:   11/09/18 (!) 133/93     Estimated body mass index is 26.05 kg/m  as calculated from the following:    Height as of 11/9/18: 1.753 m (5' 9\").    Weight as of 11/9/18: 80 kg (176 lb 5.9 oz).    {BP Counseling- Complete if BP >= 120/80  (Optional):688169}  {Weight Management Plan (ACO) Complete if BMI is abnormal-  Ages 18-64  BMI >24.9.  Age 65+ with BMI <23 or >30 (Optional):096424}     reports that he quit smoking about 6 years ago. His smoking use included cigarettes. He quit smokeless tobacco use about 5 years ago.  {Tobacco Cessation -- Complete if patient is a smoker (Optional):272757}    Counseling Resources:  ATP IV Guidelines  Pooled Cohorts Equation Calculator  FRAX Risk Assessment  ICSI Preventive Guidelines  Dietary Guidelines for Americans, 2010  USDA's " MyPlate  ASA Prophylaxis  Lung CA Screening    Cherie Magana MD  [facility]

## 2019-03-07 ENCOUNTER — OFFICE VISIT (OUTPATIENT)
Dept: PEDIATRICS | Facility: CLINIC | Age: 39
End: 2019-03-07
Payer: COMMERCIAL

## 2019-03-07 VITALS
HEIGHT: 69 IN | HEART RATE: 67 BPM | OXYGEN SATURATION: 98 % | DIASTOLIC BLOOD PRESSURE: 62 MMHG | BODY MASS INDEX: 26.22 KG/M2 | WEIGHT: 177 LBS | TEMPERATURE: 96.6 F | SYSTOLIC BLOOD PRESSURE: 104 MMHG

## 2019-03-07 DIAGNOSIS — Z13.220 SCREENING CHOLESTEROL LEVEL: ICD-10-CM

## 2019-03-07 DIAGNOSIS — M54.6 ACUTE RIGHT-SIDED THORACIC BACK PAIN: ICD-10-CM

## 2019-03-07 DIAGNOSIS — F32.1 MODERATE MAJOR DEPRESSION (H): ICD-10-CM

## 2019-03-07 DIAGNOSIS — Z00.00 ROUTINE HISTORY AND PHYSICAL EXAMINATION OF ADULT: Primary | ICD-10-CM

## 2019-03-07 DIAGNOSIS — B00.1 RECURRENT COLD SORES: ICD-10-CM

## 2019-03-07 DIAGNOSIS — E78.00 PURE HYPERCHOLESTEROLEMIA: ICD-10-CM

## 2019-03-07 DIAGNOSIS — Z13.1 SCREENING FOR DIABETES MELLITUS: ICD-10-CM

## 2019-03-07 DIAGNOSIS — F41.9 ANXIETY: ICD-10-CM

## 2019-03-07 LAB
ALBUMIN SERPL-MCNC: 4.2 G/DL (ref 3.4–5)
ALP SERPL-CCNC: 47 U/L (ref 40–150)
ALT SERPL W P-5'-P-CCNC: 24 U/L (ref 0–70)
ANION GAP SERPL CALCULATED.3IONS-SCNC: 3 MMOL/L (ref 3–14)
AST SERPL W P-5'-P-CCNC: 16 U/L (ref 0–45)
BILIRUB SERPL-MCNC: 0.6 MG/DL (ref 0.2–1.3)
BUN SERPL-MCNC: 18 MG/DL (ref 7–30)
CALCIUM SERPL-MCNC: 8.5 MG/DL (ref 8.5–10.1)
CHLORIDE SERPL-SCNC: 106 MMOL/L (ref 94–109)
CHOLEST SERPL-MCNC: 210 MG/DL
CO2 SERPL-SCNC: 30 MMOL/L (ref 20–32)
CREAT SERPL-MCNC: 1.1 MG/DL (ref 0.66–1.25)
GFR SERPL CREATININE-BSD FRML MDRD: 83 ML/MIN/{1.73_M2}
GLUCOSE SERPL-MCNC: 87 MG/DL (ref 70–99)
HDLC SERPL-MCNC: 41 MG/DL
LDLC SERPL CALC-MCNC: 154 MG/DL
NONHDLC SERPL-MCNC: 169 MG/DL
POTASSIUM SERPL-SCNC: 4.2 MMOL/L (ref 3.4–5.3)
PROT SERPL-MCNC: 7.1 G/DL (ref 6.8–8.8)
SODIUM SERPL-SCNC: 139 MMOL/L (ref 133–144)
TRIGL SERPL-MCNC: 73 MG/DL

## 2019-03-07 PROCEDURE — 99396 PREV VISIT EST AGE 40-64: CPT | Performed by: INTERNAL MEDICINE

## 2019-03-07 PROCEDURE — 80053 COMPREHEN METABOLIC PANEL: CPT | Performed by: INTERNAL MEDICINE

## 2019-03-07 PROCEDURE — 80061 LIPID PANEL: CPT | Performed by: INTERNAL MEDICINE

## 2019-03-07 PROCEDURE — 36415 COLL VENOUS BLD VENIPUNCTURE: CPT | Performed by: INTERNAL MEDICINE

## 2019-03-07 RX ORDER — VALACYCLOVIR HYDROCHLORIDE 500 MG/1
500 TABLET, FILM COATED ORAL DAILY
Qty: 90 TABLET | Refills: 3 | Status: SHIPPED | OUTPATIENT
Start: 2019-03-07 | End: 2020-04-13

## 2019-03-07 ASSESSMENT — ENCOUNTER SYMPTOMS
NERVOUS/ANXIOUS: 0
MYALGIAS: 0
SHORTNESS OF BREATH: 1
DIZZINESS: 0
DYSURIA: 0
PALPITATIONS: 0
NAUSEA: 0
FEVER: 0
CONSTIPATION: 0
HEMATOCHEZIA: 0
EYE PAIN: 0
JOINT SWELLING: 0
WEAKNESS: 0
SORE THROAT: 0
FREQUENCY: 0
HEMATURIA: 0
CHILLS: 0
DIARRHEA: 0
ARTHRALGIAS: 0
HEARTBURN: 0
COUGH: 0
ABDOMINAL PAIN: 0
PARESTHESIAS: 0
HEADACHES: 0

## 2019-03-07 ASSESSMENT — ANXIETY QUESTIONNAIRES
1. FEELING NERVOUS, ANXIOUS, OR ON EDGE: NOT AT ALL
IF YOU CHECKED OFF ANY PROBLEMS ON THIS QUESTIONNAIRE, HOW DIFFICULT HAVE THESE PROBLEMS MADE IT FOR YOU TO DO YOUR WORK, TAKE CARE OF THINGS AT HOME, OR GET ALONG WITH OTHER PEOPLE: NOT DIFFICULT AT ALL
2. NOT BEING ABLE TO STOP OR CONTROL WORRYING: NOT AT ALL
3. WORRYING TOO MUCH ABOUT DIFFERENT THINGS: NOT AT ALL
6. BECOMING EASILY ANNOYED OR IRRITABLE: NOT AT ALL
5. BEING SO RESTLESS THAT IT IS HARD TO SIT STILL: NOT AT ALL
7. FEELING AFRAID AS IF SOMETHING AWFUL MIGHT HAPPEN: NOT AT ALL
GAD7 TOTAL SCORE: 0

## 2019-03-07 ASSESSMENT — PATIENT HEALTH QUESTIONNAIRE - PHQ9
SUM OF ALL RESPONSES TO PHQ QUESTIONS 1-9: 1
5. POOR APPETITE OR OVEREATING: NOT AT ALL

## 2019-03-07 ASSESSMENT — MIFFLIN-ST. JEOR: SCORE: 1695.31

## 2019-03-07 NOTE — LETTER
[facility]  [address]  [place] [address]                  [phone]   March 7, 2019    Gagan Herrera  [address]  [facility] [address]      Dear Gagan,    Here is a summary of your recent test results:    1. Your cholesterol looks a little elevated, but not in the range that we need to treat with medication. Your total cholesterol is up to 210 from 205 (normal is 200 or less). Your LDL or bad cholesterol is up to 154 from 131 (normal is less than 100). Your HDL or good cholesterol is good at 41 (normal is 50 or higher). I recommend decreasing fats in your diet and increasing exercise. You should recheck your cholesterol in 1 year.    2. Your electrolytes, liver function, kidney function and diabetes screen are all normal.    Please let me know if you have any questions. Great to see you!    Your test results are enclosed.                 Thank you very much for choosing [facility]    Best regards,    Cherie Magana MD        Results for orders placed or performed in visit on 03/07/19   Lipid panel reflex to direct LDL Fasting   Result Value Ref Range    Cholesterol 210 (H) <200 mg/dL    Triglycerides 73 <150 mg/dL    HDL Cholesterol 41 >39 mg/dL    LDL Cholesterol Calculated 154 (H) <100 mg/dL    Non HDL Cholesterol 169 (H) <130 mg/dL   Comprehensive metabolic panel   Result Value Ref Range    Sodium 139 133 - 144 mmol/L    Potassium 4.2 3.4 - 5.3 mmol/L    Chloride 106 94 - 109 mmol/L    Carbon Dioxide 30 20 - 32 mmol/L    Anion Gap 3 3 - 14 mmol/L    Glucose 87 70 - 99 mg/dL    Urea Nitrogen 18 7 - 30 mg/dL    Creatinine 1.10 0.66 - 1.25 mg/dL    GFR Estimate 83 >60 mL/min/[1.73_m2]    GFR Estimate If Black >90 >60 mL/min/[1.73_m2]    Calcium 8.5 8.5 - 10.1 mg/dL    Bilirubin Total 0.6 0.2 - 1.3 mg/dL    Albumin 4.2 3.4 - 5.0 g/dL    Protein Total 7.1 6.8 - 8.8 g/dL    Alkaline Phosphatase 47 40 - 150 U/L    ALT 24 0 - 70 U/L    AST 16 0  - 45 U/L

## 2019-03-07 NOTE — PROGRESS NOTES
SUBJECTIVE:   CC: Gagan Herrera is an 38 year old male who presents for preventative health visit.     Physical   Annual:     Getting at least 3 servings of Calcium per day:  Yes    Bi-annual eye exam:  NO    Dental care twice a year:  NO    Sleep apnea or symptoms of sleep apnea:  None    Diet:  Regular (no restrictions)    Frequency of exercise:  2-3 days/week    Duration of exercise:  30-45 minutes    Taking medications regularly:  Yes    Medication side effects:  None    Additional concerns today:  Yes    PHQ-2 Total Score: 0    Back - threw out back this am when picking up son. Right mid-back. Seeing chiropractor later today. Hasn't been taking anything for the pain today so far.    Depression and Anxiety Follow-Up    Status since last visit: No change    Other associated symptoms:None    Complicating factors:     Significant life event: No     Current substance abuse: None    Son now 5 months. No longer colicky. Symptoms improved. Sleeping better much of the time. Taking sertraline 100 mg daily. Having sexual side effects. Wondering if we could try decreasing the dose.     PHQ 2/21/2018 10/25/2018 3/7/2019   PHQ-9 Total Score 3 19 1   Q9: Suicide Ideation Not at all Not at all Not at all     MALIK-7 SCORE 2/21/2018 10/25/2018 3/7/2019   Total Score 4 11 0       PHQ-9  English  PHQ-9   Any Language  MALIK-7  Suicide Assessment Five-step Evaluation and Treatment (SAFE-T)    Today's PHQ-2 Score:   PHQ-2 ( 1999 [employer]) 3/7/2019   Q1: Little interest or pleasure in doing things 0   Q2: Feeling down, depressed or hopeless 0   PHQ-2 Score 0   Q1: Little interest or pleasure in doing things Not at all   Q2: Feeling down, depressed or hopeless Not at all   PHQ-2 Score 0     Abuse: Current or Past(Physical, Sexual or Emotional)- No  Do you feel safe in your environment? Yes    Social History     Tobacco Use     Smoking status: Former Smoker     Types: Cigarettes     Last attempt to quit: 2/17/2013     Years since quitting:  6.0     Smokeless tobacco: Former User     Quit date: 10/15/2013   Substance Use Topics     Alcohol use: Yes     Alcohol/week: 0.0 oz     Comment: soc     Alcohol Use 3/7/2019   If you drink alcohol do you typically have greater than 3 drinks per day OR greater than 7 drinks per week? No   No flowsheet data found.    Last PSA: No results found for: PSA    Reviewed orders with patient. Reviewed health maintenance and updated orders accordingly - Yes  Patient Active Problem List   Diagnosis     CARDIOVASCULAR SCREENING; LDL GOAL LESS THAN 160     Moderate major depression (H)     Recurrent cold sores     Anxiety     Past Surgical History:   Procedure Laterality Date     APPENDECTOMY       SINUS SURGERY         Social History     Tobacco Use     Smoking status: Former Smoker     Types: Cigarettes     Last attempt to quit: 2013     Years since quittin.0     Smokeless tobacco: Former User     Quit date: 10/15/2013   Substance Use Topics     Alcohol use: Yes     Alcohol/week: 0.0 oz     Comment: soc     Family History   Problem Relation Age of Onset     Breast Cancer Maternal Grandmother      Myocardial Infarction Maternal Grandfather      Chronic Obstructive Pulmonary Disease Paternal Grandfather         smoker     Unknown/Adopted Father          Reviewed and updated as needed this visit by clinical staff  Tobacco  Allergies  Meds  Problems  Med Hx  Surg Hx  Fam Hx  Soc Hx          Reviewed and updated as needed this visit by Provider  Tobacco  Allergies  Meds  Problems  Med Hx  Surg Hx  Fam Hx          Review of Systems   Constitutional: Negative for chills and fever.   HENT: Positive for congestion. Negative for ear pain, hearing loss and sore throat.    Eyes: Negative for pain and visual disturbance.   Respiratory: Positive for shortness of breath. Negative for cough.    Cardiovascular: Negative for chest pain, palpitations and peripheral edema.   Gastrointestinal: Negative for abdominal pain,  "constipation, diarrhea, heartburn, hematochezia and nausea.   Genitourinary: Negative for discharge, dysuria, frequency, genital sores, hematuria, impotence and urgency.   Musculoskeletal: Negative for arthralgias, joint swelling and myalgias.   Skin: Negative for rash.   Neurological: Negative for dizziness, weakness, headaches and paresthesias.   Psychiatric/Behavioral: Negative for mood changes. The patient is not nervous/anxious.      SOB: has had most of his life. Sometimes hard to get a deep breath and breathes more deeply. Doesn't always realize is doing this. Often wife brings to his attention. Not worse with exercise. No chest pain. Does tend to be more congested. Not sure if worse with congestion. Has had EKG and CXR in past and negative.    OBJECTIVE:   /62 (BP Location: Right arm, Patient Position: Sitting, Cuff Size: Adult Large)   Pulse 67   Temp 96.6  F (35.9  C) (Tympanic)   Ht 1.74 m (5' 8.5\")   Wt 80.3 kg (177 lb)   SpO2 98%   BMI 26.52 kg/m    Physical Exam  GENERAL: healthy, alert and no distress  EYES: Eyes grossly normal to inspection, PERRL and conjunctivae and sclerae normal  HENT: ear canals and TM's normal, nose and mouth without ulcers or lesions  NECK: no adenopathy, no asymmetry, masses, or scars and thyroid normal to palpation  RESP: lungs clear to auscultation - no rales, rhonchi or wheezes  CV: regular rate and rhythm, normal S1 S2, no S3 or S4, no murmur, click or rub, no peripheral edema and peripheral pulses strong  ABDOMEN: soft, nontender, no hepatosplenomegaly, no masses and bowel sounds normal  MS: mild tenderness to palpation over right lower thoracic paraspinal muscles. no gross musculoskeletal defects noted, no edema  SKIN: no suspicious lesions or rashes  NEURO: Normal strength and tone, mentation intact and speech normal  PSYCH: mentation appears normal, affect normal/bright    Diagnostic Test Results:  Results for orders placed or performed in visit on " 03/07/19   Lipid panel reflex to direct LDL Fasting   Result Value Ref Range    Cholesterol 210 (H) <200 mg/dL    Triglycerides 73 <150 mg/dL    HDL Cholesterol 41 >39 mg/dL    LDL Cholesterol Calculated 154 (H) <100 mg/dL    Non HDL Cholesterol 169 (H) <130 mg/dL   Comprehensive metabolic panel   Result Value Ref Range    Sodium 139 133 - 144 mmol/L    Potassium 4.2 3.4 - 5.3 mmol/L    Chloride 106 94 - 109 mmol/L    Carbon Dioxide 30 20 - 32 mmol/L    Anion Gap 3 3 - 14 mmol/L    Glucose 87 70 - 99 mg/dL    Urea Nitrogen 18 7 - 30 mg/dL    Creatinine 1.10 0.66 - 1.25 mg/dL    GFR Estimate 83 >60 mL/min/[1.73_m2]    GFR Estimate If Black >90 >60 mL/min/[1.73_m2]    Calcium 8.5 8.5 - 10.1 mg/dL    Bilirubin Total 0.6 0.2 - 1.3 mg/dL    Albumin 4.2 3.4 - 5.0 g/dL    Protein Total 7.1 6.8 - 8.8 g/dL    Alkaline Phosphatase 47 40 - 150 U/L    ALT 24 0 - 70 U/L    AST 16 0 - 45 U/L       ASSESSMENT/PLAN:   1. Routine history and physical examination of adult  Tdap, flu vaccines UTD    2. Moderate major depression (H)  3. Anxiety  Much improved, but having sexual side effects. Will decrease sertraline to 50 mg daily. Discussed will take 4-6 weeks to get to steady level. Will f/u if anxiety/depression worsen or if does not help sexual side effects. Otherwise, will repeat PHQ9 in 6 months and I will see him back in 1 year. If sexual side effects do not improve, could consider switching to different serotonin specific reuptake inhibitor vs adding in wellbutrin.  - sertraline (ZOLOFT) 50 MG tablet; Take 1 tablet (50 mg) by mouth daily  Dispense: 90 tablet; Refill: 1    4. Recurrent cold sores  Controlled.  - valACYclovir (VALTREX) 500 MG tablet; Take 1 tablet (500 mg) by mouth daily  Dispense: 90 tablet; Refill: 3    5. Acute right-sided thoracic back pain  Did not fully address today. Seeing chiro later today. Likely muscular.    6. Pure hypercholesterolemia  LDL up. Recommend decrease fat in diet and recheck in 1  "year.    7. Screening cholesterol level  - Lipid panel reflex to direct LDL Fasting    8. Screening for diabetes mellitus  - Comprehensive metabolic panel    COUNSELING:   Reviewed preventive health counseling, as reflected in patient instructions  Special attention given to:        Regular exercise       Healthy diet/nutrition       Family planning    BP Readings from Last 1 Encounters:   03/07/19 104/62     Estimated body mass index is 26.52 kg/m  as calculated from the following:    Height as of this encounter: 1.74 m (5' 8.5\").    Weight as of this encounter: 80.3 kg (177 lb).      Weight management plan: Discussed healthy diet and exercise guidelines     reports that he quit smoking about 6 years ago. His smoking use included cigarettes. He quit smokeless tobacco use about 5 years ago.      Counseling Resources:  ATP IV Guidelines  Pooled Cohorts Equation Calculator  FRAX Risk Assessment  ICSI Preventive Guidelines  Dietary Guidelines for Americans, 2010  USDA's MyPlate  ASA Prophylaxis  Lung CA Screening    Cherie Magana MD  [facility]  "

## 2019-03-08 ASSESSMENT — ANXIETY QUESTIONNAIRES: GAD7 TOTAL SCORE: 0

## 2019-03-19 PROBLEM — E78.00 PURE HYPERCHOLESTEROLEMIA: Status: ACTIVE | Noted: 2019-03-19

## 2019-03-19 PROBLEM — E78.00 PURE HYPERCHOLESTEROLEMIA: Status: ACTIVE | Noted: 2019-03-07

## 2019-07-02 ENCOUNTER — TELEPHONE (OUTPATIENT)
Dept: PEDIATRICS | Facility: CLINIC | Age: 39
End: 2019-07-02

## 2019-07-02 DIAGNOSIS — F41.9 ANXIETY: ICD-10-CM

## 2019-07-02 DIAGNOSIS — F32.1 MODERATE MAJOR DEPRESSION (H): ICD-10-CM

## 2019-07-02 NOTE — TELEPHONE ENCOUNTER
The pt's pcp isn't in clinic nor am I finding any available appointments today with anyone. Please advise on next steps. Thank you.   Dary Desai on 7/2/2019 at 12:21 PM

## 2019-07-02 NOTE — TELEPHONE ENCOUNTER
LOV was on 3/7/19 for px. We have never rx'ed ativan for this pt. He is taking zoloft for his anxiety/depression.     Because of he is requesting a new drug, we need some kind of a visit for a consult. He isn't active in , so can offer a phone visit or an OV. Thanks.    Shi, RN  Triage Nurse

## 2019-07-02 NOTE — TELEPHONE ENCOUNTER
Check with other providers whether they can do a phone visit for this pt. RM had opening. Can offer an appointment in any clinic. If not, pt need to go to .     Shi, RN  Triage Nurse

## 2019-07-02 NOTE — TELEPHONE ENCOUNTER
I called and spoke to the pt and offered him an appointment at [facility] and he declined. He states that he is fine and he will follow up with Dr. Magana when he gets back.   Dary Desai on 7/2/2019 at 12:35 PM

## 2019-07-02 NOTE — TELEPHONE ENCOUNTER
Patient should have enough to get through 9/7/19. Prescription sent 3/7/19 #90, 1 refill.     BRIDGET Lord RN

## 2019-08-08 DIAGNOSIS — F32.1 MODERATE MAJOR DEPRESSION (H): ICD-10-CM

## 2019-08-08 DIAGNOSIS — F41.9 ANXIETY: ICD-10-CM

## 2019-08-08 NOTE — TELEPHONE ENCOUNTER
"Requested Prescriptions   Pending Prescriptions Disp Refills     sertraline (ZOLOFT) 50 MG tablet [Pharmacy Med Name: SERTRALINE HCL 50 MG TABLET]  Last Written Prescription Date:  03/07/2019  Last Fill Quantity: 90 tablet,  # refills: 1   Last Office Visit: 3/7/2019 Cherie Magana MD   Future Office Visit:      90 tablet 1     Sig: TAKE 1 TABLET BY MOUTH EVERY DAY       SSRIs Protocol Passed - 8/8/2019 11:48 AM        Passed - PHQ-9 score less than 5 in past 6 months     Please review last PHQ-9 score.           Passed - Medication is active on med list        Passed - Patient is age 18 or older        Passed - Recent (6 mo) or future (30 days) visit within the authorizing provider's specialty     Patient had office visit in the last 6 months or has a visit in the next 30 days with authorizing provider or within the authorizing provider's specialty.  See \"Patient Info\" tab in inbasket, or \"Choose Columns\" in Meds & Orders section of the refill encounter.              "

## 2019-08-09 NOTE — TELEPHONE ENCOUNTER
Prescription approved per [facility] Refill Protocol.  Gregory Rondon, RN, BSN

## 2019-09-05 ENCOUNTER — OFFICE VISIT (OUTPATIENT)
Dept: URGENT CARE | Facility: URGENT CARE | Age: 39
End: 2019-09-05
Payer: COMMERCIAL

## 2019-09-05 VITALS
WEIGHT: 179.9 LBS | DIASTOLIC BLOOD PRESSURE: 80 MMHG | BODY MASS INDEX: 26.96 KG/M2 | HEART RATE: 81 BPM | SYSTOLIC BLOOD PRESSURE: 112 MMHG | OXYGEN SATURATION: 96 % | TEMPERATURE: 98.6 F

## 2019-09-05 DIAGNOSIS — M25.562 ACUTE PAIN OF LEFT KNEE: Primary | ICD-10-CM

## 2019-09-05 PROCEDURE — 99213 OFFICE O/P EST LOW 20 MIN: CPT | Performed by: PHYSICIAN ASSISTANT

## 2019-09-05 RX ORDER — DICLOFENAC SODIUM 75 MG/1
75 TABLET, DELAYED RELEASE ORAL 2 TIMES DAILY
Qty: 40 TABLET | Refills: 0 | Status: SHIPPED | OUTPATIENT
Start: 2019-09-05 | End: 2020-05-04

## 2019-09-05 NOTE — PROGRESS NOTES
SUBJECTIVE:  Chief Complaint   Patient presents with     Urgent Care     lef tknee pain      Gagan Herrera is a 38 year old male who presents with a chief complaint of left knee pain. Patient reports that knee pain started several weeks ago, and over the past 2 days has increased. He denies having any trauma to the area. The pain is localized to the left knee and it is worse with pressure. He has been icing and taking OTC for his symptoms. He reports working very long hours and sitting for extended periods of time. When he sits, he crosses his feet and is concerned that he may have irritated his knee doing this. He has intermittent tingling into his toes. No icy or cold sensation. Has had prior ankle surgery on the left side. NO hip or back pain.       Past Medical History:   Diagnosis Date     STD (sexually transmitted disease)      Current Outpatient Medications   Medication Sig Dispense Refill     mometasone (ELOCON) 0.1 % cream Apply topically daily as needed 15 g 1     sertraline (ZOLOFT) 50 MG tablet TAKE 1 TABLET BY MOUTH EVERY DAY 90 tablet 0     valACYclovir (VALTREX) 500 MG tablet Take 1 tablet (500 mg) by mouth daily 90 tablet 3     Social History     Tobacco Use     Smoking status: Former Smoker     Types: Cigarettes     Last attempt to quit: 2013     Years since quittin.5     Smokeless tobacco: Former User     Quit date: 10/15/2013   Substance Use Topics     Alcohol use: Yes     Alcohol/week: 0.0 oz     Comment: soc       ROS:  Review of systems negative except as stated above.    EXAM:   /80 (BP Location: Right arm, Patient Position: Chair, Cuff Size: Adult Regular)   Pulse 81   Temp 98.6  F (37  C) (Oral)   Wt 81.6 kg (179 lb 14.4 oz)   SpO2 96%   BMI 26.96 kg/m    M/S Exam: Left knee does not have any point tenderness. NO erythema, swelling or bruising is noted. He has FROM, but pain with full extension and flexion.   GENERAL APPEARANCE: healthy, alert and no  distress  EXTREMITIES: peripheral pulses normal  SKIN: no suspicious lesions or rashes  NEURO: Normal strength and tone, sensory exam grossly normal, mentation intact and speech normal    ASSESSMENT / PLAN:  1. Acute pain of left knee  Knee pain for the last several weeks, now worsening over the last 2 days. Knee exam is not revealing. XR not obtained, has not had significant trauma and doubt osteoarthritis. I advised avoiding exacerbating activities, taking diclofenac and ice. Should start PT to learn new exercises. If no improvement in 3-4 sessions, follow-up with sports med for recheck.   - diclofenac (VOLTAREN) 75 MG EC tablet; Take 1 tablet (75 mg) by mouth 2 times daily  Dispense: 40 tablet; Refill: 0  - PHYSICAL THERAPY REFERRAL; Future    Diagnosis and treatment plan was reviewed with patient and/or family.   We went over any labs or imaging.   Patient verbalizes understanding. All questions were addressed and answered.   Herminia Resendez PA-C

## 2019-09-05 NOTE — PATIENT INSTRUCTIONS
Patient Education     Knee Pain  Knee pain is very common. It s especially common in active people who put a lot of pressure on their knees, like runners. It affects women more often than men.  Your kneecap (patella) is a thick, round bone. It covers and protects the front portion of your knee joint. It moves along a groove in your thighbone (femur) as part of the patellofemoral joint. A layer of cartilage surrounds the underside of your kneecap. This layer protects it from grinding against your femur.  When this cartilage softens and breaks down, it can cause knee pain. This is partly because of repetitive stress. The stress irritates the lining of the joint. This causes pain in the underlying bone.  What causes knee pain?  Many things can cause knee pain. You may have more than one cause. Some of these include:    Overuse of the knee joint    The kneecap doesn t line up with the tissue around it    Damage to small nerves in the area    Damage to the ligament-like structure that holds the kneecap in place (retinaculum)    Breakdown of the bone under the cartilage    Swelling in the soft tissues around the kneecap    Injury  You might be more likely to have knee pain if you:    Exercise a lot    Recently increased the intensity of your workouts    Have a body mass index (BMI) greater than 25    Have poor alignment of your kneecap    Walk with your feet turned overly outward or inward    Have weakness in surrounding muscle groups (inner quad or hip adductor muscles)    Have too much tightness in surrounding muscle groups (hamstrings or iliotibial band)    Have a recent history of injury to the area    Are female  Symptoms of knee pain  This type of knee pain is a dull, aching pain in the front of the knee in the area under and around the kneecap. This pain may start quickly or slowly. Your pain might be worse when you squat, run, or sit for a long time. You might also sometimes feel like your knee is giving out.  You may have symptoms in one or both of your knees.  Diagnosing knee pain  Your healthcare provider will ask about your medical history and your symptoms. Be sure to describe any activities that make your knee pain worse. He or she will look at your knee. This will include tests of your range of motion, strength, and areas of pain of your knee. Your knee alignment will be checked.  Your healthcare provider will need to rule out other causes of your knee pain, such as arthritis. You may need an imaging test, such as an X-ray or MRI.  Treatment for knee pain  Treatments that can help ease your symptoms may include:    Avoiding activities for a while that make your pain worse, returning to activity over time    Icing the outside of your knee when it causes you pain    Taking over-the-counter pain medicine    Wearing a knee brace or taping your knee to support it    Wearing special shoe inserts to help keep your feet in the proper alignment    Doing special exercises to stretch and strengthen the muscles around your hip and your knee  These steps help most people manage knee pain. But some cases of knee pain need to be treated with surgery. You may need surgery right away. Or you may need it later if other treatments don t work. Your healthcare provider may refer you to an orthopedic surgeon. He or she will talk with you about your choices.  Preventing knee pain  Losing weight and correcting excess muscle tightness or muscle weakness may help lower your risk.  In some cases, you can prevent knee pain. To help prevent a flare-up of knee pain, you do these things:    Regularly do all the exercises your doctor or physical therapist advises    Support your knee as advised by your doctor or physical therapist    Increase training gradually, and ease up on training when needed    Have an expert check your gait for running or other sporting activities    Stretch properly before and after exercise    Replace your running shoes  regularly    Lose excess weight     When to call your healthcare provider  Call your healthcare provider right away if:    Your symptoms don t get better after a few weeks of treatment    You have any new symptoms   Date Last Reviewed: 4/1/2017 2000-2018 [employer]. [address] [place] [address], [place] [address]. All rights reserved. This information is not intended as a substitute for professional medical care. Always follow your healthcare professional's instructions.

## 2019-10-16 ENCOUNTER — TELEPHONE (OUTPATIENT)
Dept: PEDIATRICS | Facility: CLINIC | Age: 39
End: 2019-10-16

## 2019-10-16 NOTE — TELEPHONE ENCOUNTER
Our goal is to have forms completed with 72 hours, however some forms may require a visit or additional information.    Who is the form from?: Patient  Where the form came from: Patient or family brought in     What clinic location was the form placed at?: [facility]  Where the form was placed: Given to physician  What number is listed as a contact on the form?: [phone]      Phone call message- patient request for a letter, form or note:    Date needed: as soon as possible  Please fax to [phone]  Has the patient signed a consent form for release of information? Not Applicable    Additional comments: Pt would like a call when forms are faxed.     Call taken on 10/16/2019 at 2:12 PM by Shauna Wright    Type of letter, form or note: work

## 2019-10-16 NOTE — TELEPHONE ENCOUNTER
Forms faxed on 10/16/19 to [phone]. Called patient to inform the form was sent. Mailed original back to patient.     America Newberry MA

## 2019-12-11 DIAGNOSIS — F32.1 MODERATE MAJOR DEPRESSION (H): ICD-10-CM

## 2019-12-11 DIAGNOSIS — F41.9 ANXIETY: ICD-10-CM

## 2019-12-16 NOTE — TELEPHONE ENCOUNTER
Pt's wife, Juliocesar, called stating they are still waiting for a call in regards to pt's prescription. Let them know that we will call when Rx is sent to pharmacy.     Venus Woodruff,

## 2019-12-16 NOTE — TELEPHONE ENCOUNTER
Pt called to check on status of refill. Has been out since Friday and is starting to feel it. Please contact Pt when Rx is sent to pharmacy.     Jennie Hamtpon on 12/16/2019 at 1:49 PM

## 2019-12-17 NOTE — TELEPHONE ENCOUNTER
"Requested Prescriptions   Pending Prescriptions Disp Refills     sertraline (ZOLOFT) 50 MG tablet [Pharmacy Med Name: SERTRALINE HCL 50 MG TABLET] 30 tablet 0     Sig: TAKE 1 TABLET BY MOUTH EVERY DAY       SSRIs Protocol Failed - 12/16/2019  2:35 PM        Failed - Recent (6 mo) or future (30 days) visit within the authorizing provider's specialty     Patient had office visit in the last 6 months or has a visit in the next 30 days with authorizing provider or within the authorizing provider's specialty.  See \"Patient Info\" tab in inbasket, or \"Choose Columns\" in Meds & Orders section of the refill encounter.            Passed - PHQ-9 score less than 5 in past 6 months     Please review last PHQ-9 score.           Passed - Medication is active on med list        Passed - Patient is age 18 or older        Medication is being filled for 1 time refill only due to:  due for office visit    "

## 2019-12-18 DIAGNOSIS — F41.9 ANXIETY: ICD-10-CM

## 2019-12-18 DIAGNOSIS — F32.1 MODERATE MAJOR DEPRESSION (H): ICD-10-CM

## 2019-12-19 NOTE — TELEPHONE ENCOUNTER
"New pharmacy.    Requested Prescriptions   Signed Prescriptions Disp Refills    sertraline (ZOLOFT) 50 MG tablet 30 tablet 0     Sig: Take 1 tablet (50 mg) by mouth daily       SSRIs Protocol Failed - 12/18/2019  2:01 PM        Failed - Recent (6 mo) or future (30 days) visit within the authorizing provider's specialty     Patient had office visit in the last 6 months or has a visit in the next 30 days with authorizing provider or within the authorizing provider's specialty.  See \"Patient Info\" tab in inbasket, or \"Choose Columns\" in Meds & Orders section of the refill encounter.            Passed - PHQ-9 score less than 5 in past 6 months     Please review last PHQ-9 score.           Passed - Medication is active on med list        Passed - Patient is age 18 or older          "

## 2020-02-26 ENCOUNTER — E-VISIT (OUTPATIENT)
Dept: PEDIATRICS | Facility: CLINIC | Age: 40
End: 2020-02-26
Payer: COMMERCIAL

## 2020-02-26 DIAGNOSIS — L20.82 FLEXURAL ECZEMA: ICD-10-CM

## 2020-02-26 PROCEDURE — 99207 ZZC NO BILLABLE SERVICE THIS VISIT: CPT | Performed by: INTERNAL MEDICINE

## 2020-02-26 RX ORDER — MOMETASONE FUROATE 1 MG/G
CREAM TOPICAL DAILY PRN
Qty: 60 G | Refills: 2 | Status: SHIPPED | OUTPATIENT
Start: 2020-02-26

## 2020-03-02 ENCOUNTER — HEALTH MAINTENANCE LETTER (OUTPATIENT)
Age: 40
End: 2020-03-02

## 2020-04-11 DIAGNOSIS — B00.1 RECURRENT COLD SORES: ICD-10-CM

## 2020-04-13 RX ORDER — VALACYCLOVIR HYDROCHLORIDE 500 MG/1
TABLET, FILM COATED ORAL
Qty: 90 TABLET | Refills: 0 | Status: SHIPPED | OUTPATIENT
Start: 2020-04-13 | End: 2020-05-04

## 2020-04-13 NOTE — TELEPHONE ENCOUNTER
"Routing refill request to provider for review/approval because:  Labs not current & PCP no longer practicing with our Clinic.     Requested Prescriptions   Pending Prescriptions Disp Refills     valACYclovir (VALTREX) 500 MG tablet [Pharmacy Med Name: VALACYCLOVIR  MG TABLET] 90 tablet 0     Sig: TAKE 1 TABLET BY MOUTH EVERY DAY   Last Written Prescription Date:  3/7/19  Last Fill Quantity: 90,  # refills: 3   Last office visit: 3/7/2019 with prescribing provider:     Future Office Visit:        Antivirals for Herpes Protocol Failed - 4/11/2020 12:18 AM        Failed - Recent (12 mo) or future (30 days) visit within the authorizing provider's specialty     Patient has had an office visit with the authorizing provider or a provider within the authorizing providers department within the previous 12 mos or has a future within next 30 days. See \"Patient Info\" tab in inbasket, or \"Choose Columns\" in Meds & Orders section of the refill encounter.              Failed - Normal serum creatinine on file in past 12 months     Recent Labs   Lab Test 03/07/19  0849   CR 1.10       Ok to refill medication if creatinine is low          Passed - Patient is age 12 or older        Passed - Medication is active on med list               "

## 2020-05-04 ENCOUNTER — VIRTUAL VISIT (OUTPATIENT)
Dept: PEDIATRICS | Facility: CLINIC | Age: 40
End: 2020-05-04
Payer: COMMERCIAL

## 2020-05-04 DIAGNOSIS — F32.1 MODERATE MAJOR DEPRESSION (H): ICD-10-CM

## 2020-05-04 DIAGNOSIS — F41.9 ANXIETY: ICD-10-CM

## 2020-05-04 DIAGNOSIS — B00.1 RECURRENT COLD SORES: ICD-10-CM

## 2020-05-04 PROCEDURE — 99213 OFFICE O/P EST LOW 20 MIN: CPT | Mod: 95 | Performed by: INTERNAL MEDICINE

## 2020-05-04 RX ORDER — SERTRALINE HYDROCHLORIDE 100 MG/1
100 TABLET, FILM COATED ORAL DAILY
Qty: 90 TABLET | Refills: 3 | Status: SHIPPED | OUTPATIENT
Start: 2020-05-04 | End: 2021-08-11

## 2020-05-04 RX ORDER — VALACYCLOVIR HYDROCHLORIDE 500 MG/1
500 TABLET, FILM COATED ORAL DAILY
Qty: 90 TABLET | Refills: 3 | Status: SHIPPED | OUTPATIENT
Start: 2020-05-04 | End: 2020-11-19

## 2020-05-04 NOTE — PROGRESS NOTES
"Gagan Herrera is a 39 year old male who is being evaluated via a billable telephone visit.      The patient has been notified of following:     \"This telephone visit will be conducted via a call between you and your physician/provider. We have found that certain health care needs can be provided without the need for a physical exam.  This service lets us provide the care you need with a short phone conversation.  If a prescription is necessary we can send it directly to your pharmacy.  If lab work is needed we can place an order for that and you can then stop by our lab to have the test done at a later time.    Telephone visits are billed at different rates depending on your insurance coverage. During this emergency period, for some insurers they may be billed the same as an in-person visit.  Please reach out to your insurance provider with any questions.    If during the course of the call the physician/provider feels a telephone visit is not appropriate, you will not be charged for this service.\"    Patient has given verbal consent for Telephone visit?  Yes    What phone number would you like to be contacted at? [phone]    How would you like to obtain your AVS? E-Mail (inform patient AVS not encrypted)    Subjective     Gagan Herrera is a 39 year old male who presents to clinic today for the following health issues:    HPI  Depression and Anxiety Follow-Up    How are you doing with your depression since your last visit? No change    How are you doing with your anxiety since your last visit?  No change    Are you having other symptoms that might be associated with depression or anxiety? No    Have you had a significant life event? OTHER: unemployment temporarily out of work.    Do you have any concerns with your use of alcohol or other drugs? No    Social History     Tobacco Use     Smoking status: Former Smoker     Types: Cigarettes     Last attempt to quit: 2013     Years since quittin.2     " Smokeless tobacco: Former User     Quit date: 10/15/2013   Substance Use Topics     Alcohol use: Yes     Alcohol/week: 0.0 standard drinks     Comment: soc     Drug use: No     PHQ 10/25/2018 3/7/2019 12/13/2019   PHQ-9 Total Score 19 1 4   Q9: Thoughts of better off dead/self-harm past 2 weeks Not at all Not at all Not at all     MALIK-7 SCORE 2/21/2018 10/25/2018 3/7/2019   Total Score 4 11 0     Last PHQ-9 12/13/2019   1.  Little interest or pleasure in doing things 0   2.  Feeling down, depressed, or hopeless 0   3.  Trouble falling or staying asleep, or sleeping too much 2   4.  Feeling tired or having little energy 1   5.  Poor appetite or overeating 1   6.  Feeling bad about yourself 0   7.  Trouble concentrating 0   8.  Moving slowly or restless 0   Q9: Thoughts of better off dead/self-harm past 2 weeks 0   PHQ-9 Total Score 4   Difficulty at work, home, or with people -     MALIK-7  3/7/2019   1. Feeling nervous, anxious, or on edge 0   2. Not being able to stop or control worrying 0   3. Worrying too much about different things 0   4. Trouble relaxing 0   5. Being so restless that it is hard to sit still 0   6. Becoming easily annoyed or irritable 0   7. Feeling afraid, as if something awful might happen 0   MALIK-7 Total Score 0   If you checked any problems, how difficult have they made it for you to do your work, take care of things at home, or get along with other people? Not difficult at all         Suicide Assessment Five-step Evaluation and Treatment (SAFE-T)      How many servings of fruits and vegetables do you eat daily?  2-3    On average, how many sweetened beverages do you drink each day (Examples: soda, juice, sweet tea, etc.  Do NOT count diet or artificially sweetened beverages)?   0    How many days per week do you exercise enough to make your heart beat faster? 5    How many minutes a day do you exercise enough to make your heart beat faster? 30 - 60    How many days per week do you miss taking  your medication? 0     Gagan calls in to follow-up on his mood. He reports that he is actually taking 100mg daily. Feels like his anxiety and depression are well controlled on this dose. He's been on and off this medication.     Is temporarily off work (just a leave, not a layoff). Able to get unemployment. Hasn't gotten furloughed yet, wouldn't happen until September. Doesn't feel worried about his job.     No side effects with his medications.    On chronic Valtrex for herpes suppression.     Patient Active Problem List   Diagnosis     CARDIOVASCULAR SCREENING; LDL GOAL LESS THAN 160     Moderate major depression (H)     Recurrent cold sores     Anxiety     Pure hypercholesterolemia     Past Surgical History:   Procedure Laterality Date     APPENDECTOMY       SINUS SURGERY         Social History     Tobacco Use     Smoking status: Former Smoker     Types: Cigarettes     Last attempt to quit: 2013     Years since quittin.2     Smokeless tobacco: Former User     Quit date: 10/15/2013   Substance Use Topics     Alcohol use: Yes     Alcohol/week: 0.0 standard drinks     Comment: soc     Family History   Problem Relation Age of Onset     Breast Cancer Maternal Grandmother      Myocardial Infarction Maternal Grandfather      Chronic Obstructive Pulmonary Disease Paternal Grandfather         smoker     Unknown/Adopted Father            Reviewed and updated as needed this visit by Provider  Meds         Review of Systems   ROS COMP: Constitutional, HEENT, psych systems are negative, except as otherwise noted.       Objective   Reported vitals:  There were no vitals taken for this visit.   PSYCH: Alert and oriented times 3; coherent speech, normal   rate and volume, able to articulate logical thoughts, able   to abstract reason, no tangential thoughts, no hallucinations   or delusions  His affect is normal  RESP: No cough, no audible wheezing, able to talk in full sentences  Remainder of exam unable to be  completed due to telephone visits          Assessment/Plan:    ICD-10-CM    1. Moderate major depression (H)  F32.1 sertraline (ZOLOFT) 100 MG tablet   2. Anxiety  F41.9 sertraline (ZOLOFT) 100 MG tablet   3. Recurrent cold sores  B00.1 valACYclovir (VALTREX) 500 MG tablet     All medical issues stable on current treatments, medications refilled.     No follow-ups on file.      Phone call duration:  13 minutes    Edwina Leonard MD  Internal Medicine-Pediatrics

## 2020-08-03 ENCOUNTER — TRANSFERRED RECORDS (OUTPATIENT)
Dept: HEALTH INFORMATION MANAGEMENT | Facility: CLINIC | Age: 40
End: 2020-08-03

## 2020-08-17 ENCOUNTER — VIRTUAL VISIT (OUTPATIENT)
Dept: FAMILY MEDICINE | Facility: OTHER | Age: 40
End: 2020-08-17

## 2020-08-17 NOTE — PROGRESS NOTES
"Date: 2020 09:38:14  Clinician: Candace Simmons  Clinician NPI: [id number]  Patient: Gagan Herrera  Patient : 1980  Patient Address: [facility] [place] [id number]  Patient Phone: [phone]  Visit Protocol: Low back pain  Patient Summary:  Gagan is a 39 year old ( : 1980 ) male who initiated a Visit for evaluation of Low Back Pain. When asked the question \"Please sign me up to receive news, health information and promotions. \", Gagan responded \"No\".    His back pain began gradually 1-6 days ago. The pain is present on the right side and is located in the upper back and neck area.   The pain is constant and varies depending on the activity.   He is able to walk on his toes and is able to walk on his heels with his toes lifted off the ground.   Gagan is experiencing shooting pain and back muscle spasms.    Symptom Details   Pain: The pain is severe (7-9 on a 10 point pain scale). The pain shoots into his upper back.    Gagan denies loss of bladder control, a rash in the same area as the back pain, abdominal pain, urinary frequency, vomiting, urinary retention, leg weakness, loss of bowel control, saddle anesthesia, numbness or tingling in the legs, dysuria, hematuria, and feeling feverish. His pain does not decrease when bending forward.   Precipitating events  The back pain did not begin in response to an injury that happened at his work. He doesn't know what caused his back pain.   Pertinent medical history  Gagan has had similar episodes of back pain in the past. He has not had back surgery, kidney stones, cancer, and unintended weight loss in the last three months.   He has not seen a provider to treat this episode of low back pain.   Treatments  Gagan tried using therapeutic remedies (such as cold pack, heat, chiropractic treatment, physical therapy) and over-the-counter medications to manage his low back pain.   Additional details about medications tried as reported by " the patient (free text): Ibuprofen   Other approaches used to manage the back pain as reported by the patient (free text): Chiropractor. It usually helps but didn't this time. Went two times with little to no relief   Gagan has not tried using prescription medications to manage his back pain.   Gagan does not need a return to work/school note.   Gagan does not smoke or use smokeless tobacco.     MEDICATIONS: sertraline oral, ALLERGIES: NKDA  Clinician Response:  Dear Gagan,  Based on the information you provided, you likely have Mechanical Low Back Pain.   Low back pain that is caused by placing abnormal stress and muscle or soft tissue strain (also known as mechanical low back pain) is the most common form of back pain. The majority of cases are not related with a specific disease or abnormal structure of the spine and do not require diagnostic imaging (such as an X-ray, MRI or CAT scan). Heating, cooling, back exercises and stretches should reduce your back pain within 3-4 weeks. During this time, remain active.   Medication information  I am prescribing:       Ibuprofen 800 mg oral tablet. Take 1 tablet by mouth 3 times per day as needed for 7 days. Do not exceed 2400mg in 24 hours. There are no refills with this prescription.    Taking this medication with food will decrease the risk of stomach discomfort.     Unless you are allergic to the over-the-counter medication(s) below, I recommend using:       Acetaminophen (Tylenol or store brand) oral tablet. Take 1-2 pills by mouth every 4-6 hours as needed for pain.      Lidocaine (Aspercreme) 4% patch. Apply the patch to the painful area. Patch may remain in place for up to 12 hours. No more than 1 patch should be used in a 24-hour period.     Over-the-counter medications do not require a prescription. Ask the pharmacist if you have any questions.  Self care  The following tips will help prevent and reduce back pain:     Apply heating pads on the sore area  for a short duration (10 minutes per hour and as needed). A hot bath or a heating pad on your lower back may also help reduce pain and stiffness.    Maintaining a good posture reduces stress on the back muscles. To help reduce the stress on your back:    Stand and sit up straight.    Try not to slouch when standing or sitting.    Try not to stay in the same position for a long time.    Switch positions every 20 to 30 minutes if possible.    When lifting heavy objects, squat down and use your legs rather than bending at the waist.          Stress can make your back pain worse. For this reason, take time to relax and try some relaxation techniques when you feel stressed.    Click the following link for more information: Prevent back pain.     When to seek care  Please make an appointment to be seen in a clinic or urgent care if any of the following occur:     Pain that doesn't seem to be getting better after 3 to 4 weeks    You develop new symptoms or your symptoms becomes worse    A painful rash that appears as a stripe along one side of the body    Unexplained fever    Unbearable pain    Unrelenting night pain     Seek immediate medical care if you have problems with bowel or bladder control, worsening weakness or numbness in your legs, or numbness in the inner thighs, groin, or buttocks.   Diagnosis: Mechanical low back pain  Diagnosis ICD: M54.5  Prescription: ibuprofen (IBU) 800 mg oral tablet 21 tablet, 7 days supply. Take 1 tablet by mouth 3 times per day as needed for 7 days. Refills: 0, Refill as needed: no, Allow substitutions: yes

## 2020-08-19 ENCOUNTER — VIRTUAL VISIT (OUTPATIENT)
Dept: FAMILY MEDICINE | Facility: OTHER | Age: 40
End: 2020-08-19

## 2020-08-19 NOTE — PROGRESS NOTES
"Date: 2020 14:56:57  Clinician: Willis Flores  Clinician NPI: [id number]  Patient: Gagan Herrera  Patient : 1980  Patient Address: [facility] [place] [id number]  Patient Phone: [phone]  Visit Protocol: Low back pain  Patient Summary:  Gagan is a 39 year old ( : 1980 ) male who initiated a Visit for evaluation of Low Back Pain. When asked the question \"Please sign me up to receive news, health information and promotions. \", Gagan responded \"No\".   A synchronous visit is necessary because the patient reported the following abnormal symptoms:   Unbearable pain (10 on a 10 point scale)   His back pain began gradually 1-2 weeks ago. The pain is present on the right side and is located in the upper back and neck area.   The pain is constant and varies depending on the activity.   He is able to walk on his toes and is able to walk on his heels with his toes lifted off the ground.   Gagan is experiencing shooting pain.    Symptom Details   Pain: The pain shoots into his upper back.    Gagan denies loss of bladder control, a rash in the same area as the back pain, abdominal pain, urinary frequency, vomiting, urinary retention, leg weakness, back muscle spasms, loss of bowel control, saddle anesthesia, numbness or tingling in the legs, dysuria, hematuria, and feeling feverish. His pain does not decrease when bending forward.   Precipitating events  The back pain did not begin in response to an injury that happened at his work. He doesn't know what caused his back pain.   Pertinent medical history   Gagan has not had similar episodes of back pain in the past. He has not had back surgery, kidney stones, cancer, and unintended weight loss in the last three months.   He has seen a provider to treat this episode of low back pain. Additional information about recent treatment as reported by the patient (free text): Chiropractor. I have bulging disc   Treatments  Gagan tried using " over-the-counter medications to manage his low back pain.   Additional details about medications tried as reported by the patient (free text): Ibuprofen   Gagan has not tried using therapeutic remedies (such as cold pack, heat, chiropractic treatment, physical therapy) and prescription medications to manage his back pain.   Gagan does not need a return to work/school note.   Gagan does not smoke or use smokeless tobacco.   Additional information as reported by the patient (free text): Chiropractor suggested pain meds, muscle relaxer and anti inflammatory for my condition. I've tried Advil and it did not help     MEDICATIONS: sertraline oral, ALLERGIES: NKDA  Clinician Response:  Dear Gagan,    Please call the [place] clinic to arrange follow-up in 5-7 days.  AUDREY Flores ND    Diagnosis: Cough  Diagnosis ICD: R05  Triage Notes: I reviewed the patient's history, verified their identity, and explained the Visit process.    NAD. Arm pain c/w cervical radiculopathy  Synchronous Triage: phone, status: completed, duration: 492 seconds  Prescription: ketorolac 10 mg oral tablet 28 tablet, 7 days supply. Take 1 tablet by mouth every 6 hours for 7 days as needed. Refills: 0, Refill as needed: no, Allow substitutions: yes  Prescription: prednisone 20 mg oral tablet 10 tablet, 5 days supply. Take 1 tablet by mouth 2 times per day for 5 days. Refills: 0, Refill as needed: no, Allow substitutions: yes  Pharmacy: [facility]/pharmacy #[id number] - [phone] - [address] [place] [address]

## 2020-11-16 NOTE — PROGRESS NOTES
SUBJECTIVE:   CC: Gagan Herrera is an 40 year old male who presents for preventative health visit.     Patient has been advised of split billing requirements and indicates understanding: Yes  Healthy Habits:     Getting at least 3 servings of Calcium per day:  Yes    Bi-annual eye exam:  NO    Dental care twice a year:  NO    Sleep apnea or symptoms of sleep apnea:  None    Diet:  Regular (no restrictions)    Frequency of exercise:  None    Taking medications regularly:  Yes    Medication side effects:  None    PHQ-2 Total Score: 1    Additional concerns today:  Yes (Sleep concerns - been taking spouses Xanax )    Gagan Herrera is a 40 year old male who presents today for annual physical  Feels healthy overall but admits to drinking more than he ever has  Also has tried some of his wife's xanax to help with sleep  Does attest to more stress with work at the [employer]   Feels ok during the day but difficulty with sleep and worrying; mostly financial  Tolerating zoloft well      Today's PHQ-2 Score:   PHQ-2 (  [employer]) 3/7/2019   Q1: Little interest or pleasure in doing things 0   Q2: Feeling down, depressed or hopeless 0   PHQ-2 Score 0   Q1: Little interest or pleasure in doing things Not at all   Q2: Feeling down, depressed or hopeless Not at all   PHQ-2 Score 0       Abuse: Current or Past(Physical, Sexual or Emotional)- No  Do you feel safe in your environment? Yes    Social History     Tobacco Use     Smoking status: Former Smoker     Types: Cigarettes     Quit date: 2013     Years since quittin.7     Smokeless tobacco: Former User     Quit date: 10/15/2013   Substance Use Topics     Alcohol use: Yes     Alcohol/week: 0.0 standard drinks     Comment: soc       Alcohol Use 3/7/2019   Prescreen: >3 drinks/day or >7 drinks/week? No   Prescreen: >3 drinks/day or >7 drinks/week? -   Drinking 3 drinks/night; more than usual    Last PSA: No results found for: PSA    Reviewed orders with patient. Reviewed  "health maintenance and updated orders accordingly - Yes      Reviewed and updated as needed this visit by clinical staff                 Reviewed and updated as needed this visit by Provider                    Review of Systems   Constitutional: Negative for chills and fever.   HENT: Positive for congestion. Negative for ear pain, hearing loss and sore throat.    Eyes: Negative for pain and visual disturbance.   Respiratory: Negative for cough and shortness of breath.    Cardiovascular: Negative for chest pain, palpitations and peripheral edema.   Gastrointestinal: Negative for abdominal pain, constipation, diarrhea, heartburn, hematochezia and nausea.   Genitourinary: Negative for discharge, dysuria, frequency, genital sores, hematuria, impotence and urgency.   Musculoskeletal: Negative for arthralgias, joint swelling and myalgias.   Skin: Negative for rash.   Neurological: Negative for dizziness, weakness, headaches and paresthesias.   Psychiatric/Behavioral: Negative for mood changes. The patient is nervous/anxious.      OBJECTIVE:   /70   Pulse 66   Temp 97  F (36.1  C) (Tympanic)   Resp 16   Ht 1.74 m (5' 8.5\")   Wt 86 kg (189 lb 8 oz)   SpO2 95%   BMI 28.39 kg/m      Physical Exam  GENERAL: healthy, alert and no distress  EYES: Eyes grossly normal to inspection, PERRL and conjunctivae and sclerae normal  HENT: ear canals and TM's normal, nose and mouth without ulcers or lesions  NECK: no adenopathy, no asymmetry, masses, or scars and thyroid normal to palpation  RESP: lungs clear to auscultation - no rales, rhonchi or wheezes  CV: regular rate and rhythm, normal S1 S2, no S3 or S4, no murmur, click or rub, no peripheral edema and peripheral pulses strong  ABDOMEN: soft, nontender, no hepatosplenomegaly, no masses and bowel sounds normal   (male): normal male genitalia without lesions or urethral discharge, no hernia  MS: no gross musculoskeletal defects noted, no edema  SKIN: no suspicious " "lesions or rashes  NEURO: Normal strength and tone, mentation intact and speech normal  PSYCH: mentation appears normal, affect normal/bright    Diagnostic Test Results:  Labs reviewed in Epic  Updating today  ASSESSMENT/PLAN:   1. Routine general medical examination at a health care facility  Reviewed personal and family history. Reviewed age appropriate screenings. Recommended any needed vaccinations.  - Basic metabolic panel  - Lipid panel reflex to direct LDL Fasting    2. Anxiety  Overall controlled on zoloft but some breakthrough affecting sleep after work. He had been taking some of his spouse's benzo, but knows this was dangerous. For now he's unsure about therapy but we can use below as trial. I would also like him to really watch his ETOH intake.   - hydrOXYzine (ATARAX) 25 MG tablet; Take 1 tablet (25 mg) by mouth At Bedtime  Dispense: 30 tablet; Refill: 0    3. Recurrent cold sores  Using otc lysine with excellent benfit      Patient has been advised of split billing requirements and indicates understanding: Yes  COUNSELING:   Reviewed preventive health counseling, as reflected in patient instructions    Estimated body mass index is 26.96 kg/m  as calculated from the following:    Height as of 3/7/19: 1.74 m (5' 8.5\").    Weight as of 9/5/19: 81.6 kg (179 lb 14.4 oz).       He reports that he quit smoking about 7 years ago. His smoking use included cigarettes. He quit smokeless tobacco use about 7 years ago.      Counseling Resources:  ATP IV Guidelines  Pooled Cohorts Equation Calculator  FRAX Risk Assessment  ICSI Preventive Guidelines  Dietary Guidelines for Americans, 2010  USDA's MyPlate  ASA Prophylaxis  Lung CA Screening    David Davies PA-C  [facility]  "

## 2020-11-19 ENCOUNTER — OFFICE VISIT (OUTPATIENT)
Dept: FAMILY MEDICINE | Facility: CLINIC | Age: 40
End: 2020-11-19
Payer: COMMERCIAL

## 2020-11-19 VITALS
HEART RATE: 66 BPM | OXYGEN SATURATION: 95 % | HEIGHT: 69 IN | RESPIRATION RATE: 16 BRPM | DIASTOLIC BLOOD PRESSURE: 70 MMHG | SYSTOLIC BLOOD PRESSURE: 102 MMHG | WEIGHT: 189.5 LBS | BODY MASS INDEX: 28.07 KG/M2 | TEMPERATURE: 97 F

## 2020-11-19 DIAGNOSIS — Z00.00 ROUTINE GENERAL MEDICAL EXAMINATION AT A HEALTH CARE FACILITY: Primary | ICD-10-CM

## 2020-11-19 DIAGNOSIS — B00.1 RECURRENT COLD SORES: ICD-10-CM

## 2020-11-19 DIAGNOSIS — F41.9 ANXIETY: ICD-10-CM

## 2020-11-19 LAB
ANION GAP SERPL CALCULATED.3IONS-SCNC: 4 MMOL/L (ref 3–14)
BUN SERPL-MCNC: 15 MG/DL (ref 7–30)
CALCIUM SERPL-MCNC: 9.1 MG/DL (ref 8.5–10.1)
CHLORIDE SERPL-SCNC: 106 MMOL/L (ref 94–109)
CHOLEST SERPL-MCNC: 255 MG/DL
CO2 SERPL-SCNC: 28 MMOL/L (ref 20–32)
CREAT SERPL-MCNC: 0.95 MG/DL (ref 0.66–1.25)
GFR SERPL CREATININE-BSD FRML MDRD: >90 ML/MIN/{1.73_M2}
GLUCOSE SERPL-MCNC: 97 MG/DL (ref 70–99)
HDLC SERPL-MCNC: 39 MG/DL
LDLC SERPL CALC-MCNC: 170 MG/DL
NONHDLC SERPL-MCNC: 216 MG/DL
POTASSIUM SERPL-SCNC: 4.2 MMOL/L (ref 3.4–5.3)
SODIUM SERPL-SCNC: 138 MMOL/L (ref 133–144)
TRIGL SERPL-MCNC: 232 MG/DL

## 2020-11-19 PROCEDURE — 90686 IIV4 VACC NO PRSV 0.5 ML IM: CPT | Performed by: PHYSICIAN ASSISTANT

## 2020-11-19 PROCEDURE — 90471 IMMUNIZATION ADMIN: CPT | Performed by: PHYSICIAN ASSISTANT

## 2020-11-19 PROCEDURE — 36415 COLL VENOUS BLD VENIPUNCTURE: CPT | Performed by: PHYSICIAN ASSISTANT

## 2020-11-19 PROCEDURE — 99396 PREV VISIT EST AGE 40-64: CPT | Mod: 25 | Performed by: PHYSICIAN ASSISTANT

## 2020-11-19 PROCEDURE — 99213 OFFICE O/P EST LOW 20 MIN: CPT | Mod: 25 | Performed by: PHYSICIAN ASSISTANT

## 2020-11-19 PROCEDURE — 80048 BASIC METABOLIC PNL TOTAL CA: CPT | Performed by: PHYSICIAN ASSISTANT

## 2020-11-19 PROCEDURE — 80061 LIPID PANEL: CPT | Performed by: PHYSICIAN ASSISTANT

## 2020-11-19 RX ORDER — HYDROXYZINE HYDROCHLORIDE 25 MG/1
25 TABLET, FILM COATED ORAL AT BEDTIME
Qty: 30 TABLET | Refills: 0 | Status: SHIPPED | OUTPATIENT
Start: 2020-11-19 | End: 2020-12-17

## 2020-11-19 ASSESSMENT — ENCOUNTER SYMPTOMS
CONSTIPATION: 0
PARESTHESIAS: 0
PALPITATIONS: 0
NAUSEA: 0
EYE PAIN: 0
HEMATURIA: 0
COUGH: 0
HEARTBURN: 0
NERVOUS/ANXIOUS: 1
DIZZINESS: 0
ABDOMINAL PAIN: 0
MYALGIAS: 0
HEMATOCHEZIA: 0
FREQUENCY: 0
SORE THROAT: 0
JOINT SWELLING: 0
DIARRHEA: 0
CHILLS: 0
DYSURIA: 0
WEAKNESS: 0
HEADACHES: 0
FEVER: 0
SHORTNESS OF BREATH: 0
ARTHRALGIAS: 0

## 2020-11-19 ASSESSMENT — ANXIETY QUESTIONNAIRES
6. BECOMING EASILY ANNOYED OR IRRITABLE: NOT AT ALL
GAD7 TOTAL SCORE: 3
7. FEELING AFRAID AS IF SOMETHING AWFUL MIGHT HAPPEN: NOT AT ALL
IF YOU CHECKED OFF ANY PROBLEMS ON THIS QUESTIONNAIRE, HOW DIFFICULT HAVE THESE PROBLEMS MADE IT FOR YOU TO DO YOUR WORK, TAKE CARE OF THINGS AT HOME, OR GET ALONG WITH OTHER PEOPLE: NOT DIFFICULT AT ALL
5. BEING SO RESTLESS THAT IT IS HARD TO SIT STILL: NOT AT ALL
3. WORRYING TOO MUCH ABOUT DIFFERENT THINGS: SEVERAL DAYS
2. NOT BEING ABLE TO STOP OR CONTROL WORRYING: NOT AT ALL
1. FEELING NERVOUS, ANXIOUS, OR ON EDGE: SEVERAL DAYS

## 2020-11-19 ASSESSMENT — PATIENT HEALTH QUESTIONNAIRE - PHQ9
SUM OF ALL RESPONSES TO PHQ QUESTIONS 1-9: 5
5. POOR APPETITE OR OVEREATING: SEVERAL DAYS

## 2020-11-19 ASSESSMENT — MIFFLIN-ST. JEOR: SCORE: 1742.01

## 2020-11-20 ASSESSMENT — ANXIETY QUESTIONNAIRES: GAD7 TOTAL SCORE: 3

## 2020-11-24 ENCOUNTER — TELEPHONE (OUTPATIENT)
Dept: FAMILY MEDICINE | Facility: CLINIC | Age: 40
End: 2020-11-24

## 2020-11-24 NOTE — TELEPHONE ENCOUNTER
Reason for call:  Form   Our goal is to have forms completed within 72 hours, however some forms may require a visit or additional information.     Who is the form from? Patient  Where did the form come from? Patient or family brought in     What clinic location was the form placed at? [place]  Where was the form placed? [facility] Box/Folder  What number is listed as a contact on the form? [phone]    Phone call message - patient request for a letter, form or note:     Date needed: as soon as possible  Please fax to [phone]  Has the patient signed a consent form for release of information? Not Applicable    Additional comments:     Type of letter, form or note: medical    Phone number to reach patient:  Cell number on file:    Telephone Information:   Mobile [phone]       Best Time:  any    Can we leave a detailed message on this number?  YES    Travel screening: Not Applicable

## 2020-12-15 DIAGNOSIS — F41.9 ANXIETY: ICD-10-CM

## 2020-12-17 RX ORDER — HYDROXYZINE HYDROCHLORIDE 25 MG/1
TABLET, FILM COATED ORAL
Qty: 30 TABLET | Refills: 0 | Status: SHIPPED | OUTPATIENT
Start: 2020-12-17 | End: 2021-01-04

## 2020-12-17 NOTE — TELEPHONE ENCOUNTER
Prescription approved per [facility] Refill Protocol.    Cindy Martell RN

## 2020-12-30 DIAGNOSIS — F41.9 ANXIETY: ICD-10-CM

## 2021-01-04 RX ORDER — HYDROXYZINE HYDROCHLORIDE 25 MG/1
25 TABLET, FILM COATED ORAL AT BEDTIME
Qty: 90 TABLET | Refills: 0 | Status: SHIPPED | OUTPATIENT
Start: 2021-01-04 | End: 2022-12-01

## 2021-01-04 NOTE — TELEPHONE ENCOUNTER
Will forward to Mark Davies - is it ok to fill for 90 days?  Looks like 30 day refill sent in for atarax on 12/17/2020.

## 2021-06-25 DIAGNOSIS — B00.1 RECURRENT COLD SORES: ICD-10-CM

## 2021-06-25 NOTE — TELEPHONE ENCOUNTER
Calling patient-     Patient has stopped taking it for a while and he  use to take it daily. He was trying a natural supplement, but this did not seem to work and he now has been having frequent out breaks and he feels he may need to take it daily again. Is currently having an outbreak.     He will schedule an appt via my chart. Will like this filled     Sade Tenorio RN on 6/25/2021 at 4:29 PM

## 2021-06-25 NOTE — TELEPHONE ENCOUNTER
Routing refill request to provider for review/approval because:  Drug not active on patient's medication list  Patient needs to be seen because:  Due for visit w/ PCP    Oh GAMBOA RN

## 2021-06-25 NOTE — TELEPHONE ENCOUNTER
Can we call patient and find out if he's been on this daily or if just experiencing a flare? I thought maybe at his annual check up he wasn't usign daily anymore but id like to be sure

## 2021-06-28 RX ORDER — VALACYCLOVIR HYDROCHLORIDE 500 MG/1
500 TABLET, FILM COATED ORAL 2 TIMES DAILY
Qty: 6 TABLET | Refills: 0 | Status: SHIPPED | OUTPATIENT
Start: 2021-06-28 | End: 2021-08-16

## 2021-06-28 RX ORDER — VALACYCLOVIR HYDROCHLORIDE 500 MG/1
TABLET, FILM COATED ORAL
Qty: 90 TABLET | Refills: 3 | OUTPATIENT
Start: 2021-06-28

## 2021-07-05 ENCOUNTER — E-VISIT (OUTPATIENT)
Dept: URGENT CARE | Facility: URGENT CARE | Age: 41
End: 2021-07-05
Payer: COMMERCIAL

## 2021-07-05 DIAGNOSIS — L30.9 DERMATITIS: Primary | ICD-10-CM

## 2021-07-05 PROCEDURE — 99421 OL DIG E/M SVC 5-10 MIN: CPT | Performed by: FAMILY MEDICINE

## 2021-07-05 RX ORDER — TRIAMCINOLONE ACETONIDE 1 MG/G
OINTMENT TOPICAL 2 TIMES DAILY
Qty: 80 G | Refills: 1 | Status: SHIPPED | OUTPATIENT
Start: 2021-07-05 | End: 2022-07-15

## 2021-07-15 ENCOUNTER — MYC MEDICAL ADVICE (OUTPATIENT)
Dept: FAMILY MEDICINE | Facility: CLINIC | Age: 41
End: 2021-07-15

## 2021-07-15 DIAGNOSIS — B00.1 RECURRENT COLD SORES: ICD-10-CM

## 2021-07-15 RX ORDER — VALACYCLOVIR HYDROCHLORIDE 500 MG/1
500 TABLET, FILM COATED ORAL 2 TIMES DAILY
Qty: 6 TABLET | Refills: 0 | Status: CANCELLED | OUTPATIENT
Start: 2021-07-15

## 2021-07-15 RX ORDER — VALACYCLOVIR HYDROCHLORIDE 1 G/1
2000 TABLET, FILM COATED ORAL 2 TIMES DAILY
Qty: 12 TABLET | Refills: 0 | Status: SHIPPED | OUTPATIENT
Start: 2021-07-15 | End: 2021-08-16

## 2021-08-08 DIAGNOSIS — F41.9 ANXIETY: ICD-10-CM

## 2021-08-08 DIAGNOSIS — F32.1 MODERATE MAJOR DEPRESSION (H): ICD-10-CM

## 2021-08-11 RX ORDER — SERTRALINE HYDROCHLORIDE 100 MG/1
TABLET, FILM COATED ORAL
Qty: 90 TABLET | Refills: 0 | Status: SHIPPED | OUTPATIENT
Start: 2021-08-11 | End: 2021-11-02

## 2021-08-11 NOTE — TELEPHONE ENCOUNTER
Routing refill request to provider for review/approval because:  Labs out of range:  PHQ-9  Patient needs to be seen because:  due for follow up w/ PCP    PHQ 3/7/2019 12/13/2019 11/19/2020   PHQ-9 Total Score 1 4 5   Q9: Thoughts of better off dead/self-harm past 2 weeks Not at all Not at all Not at all     Oh GAMBOA RN

## 2021-08-12 DIAGNOSIS — B00.1 RECURRENT COLD SORES: ICD-10-CM

## 2021-08-14 ENCOUNTER — E-VISIT (OUTPATIENT)
Dept: URGENT CARE | Facility: URGENT CARE | Age: 41
End: 2021-08-14
Payer: COMMERCIAL

## 2021-08-14 DIAGNOSIS — B00.1 HERPES LABIALIS: Primary | ICD-10-CM

## 2021-08-14 PROCEDURE — 99421 OL DIG E/M SVC 5-10 MIN: CPT | Performed by: PHYSICIAN ASSISTANT

## 2021-08-14 RX ORDER — VALACYCLOVIR HYDROCHLORIDE 1 G/1
2000 TABLET, FILM COATED ORAL 2 TIMES DAILY
Qty: 4 TABLET | Refills: 1 | Status: SHIPPED | OUTPATIENT
Start: 2021-08-14 | End: 2021-08-16

## 2021-08-16 ENCOUNTER — VIRTUAL VISIT (OUTPATIENT)
Dept: FAMILY MEDICINE | Facility: CLINIC | Age: 41
End: 2021-08-16
Payer: COMMERCIAL

## 2021-08-16 DIAGNOSIS — B00.1 RECURRENT COLD SORES: Primary | ICD-10-CM

## 2021-08-16 PROCEDURE — 99213 OFFICE O/P EST LOW 20 MIN: CPT | Mod: GT | Performed by: PHYSICIAN ASSISTANT

## 2021-08-16 ASSESSMENT — PATIENT HEALTH QUESTIONNAIRE - PHQ9: SUM OF ALL RESPONSES TO PHQ QUESTIONS 1-9: 0

## 2021-08-16 NOTE — PROGRESS NOTES
"Gagan is a 40 year old who is being evaluated via a billable video visit.      How would you like to obtain your AVS? MyChart  If the video visit is dropped, the invitation should be resent by: Text to cell phone: [phone]  Will anyone else be joining your video visit? No      Video Start Time: 2:41 PM    Assessment & Plan     Recurrent cold sores  Went off daily therapy for a while. Tried lysine which was ineffective. Started to have more breakouts and wants to get back on daily. Will update CBC at annual in November. Rx sent in.     BMI:   Estimated body mass index is 28.39 kg/m  as calculated from the following:    Height as of 11/19/20: 1.74 m (5' 8.5\").    Weight as of 11/19/20: 86 kg (189 lb 8 oz).         Return in about 13 weeks (around 11/15/2021) for Physical Exam, Lab Work.    David Davies PA-C  [facility]    George   Gagan is a 40 year old who presents for the following health issues     HPI     Medication Followup of Valacyclovir  He has been taking it off and on. Since he has been back to work he has been getting cold sores again. He was using lysine but was having some side effects.    Taking Medication as prescribed: yes    Side Effects:  None    Medication Helping Symptoms:  yes     Gagan Herrera is a 40 year old male who presents today for discussion of getting back on valacyclovir  Since going back to work in August of 2020 he has started to have more frequent break through symptoms  Mostly getting around lower lip but occasionally elsewhere  He is worried about passing this along  Sometimes prior to the breakouts will feel some pressure    Had initially switched to lysine during a break from work without stress and didn't have a lot of breakouts     Breakouts cause a lot of anxiety for him; worried about spreading to wife and kids        Review of Systems   Constitutional, HEENT, cardiovascular, pulmonary, gi and gu systems are negative, except as " otherwise noted.      Objective    Vitals - Patient Reported  Pain Score: No Pain (0)      Vitals:  No vitals were obtained today due to virtual visit.    Physical Exam   GENERAL: Healthy, alert and no distress  EYES: Eyes grossly normal to inspection.  No discharge or erythema, or obvious scleral/conjunctival abnormalities.  RESP: No audible wheeze, cough, or visible cyanosis.  No visible retractions or increased work of breathing.    SKIN: Visible skin clear. No significant rash, abnormal pigmentation or lesions.  NEURO: Cranial nerves grossly intact.  Mentation and speech appropriate for age.  PSYCH: Mentation appears normal, affect normal/bright, judgement and insight intact, normal speech and appearance well-groomed.            Video-Visit Details    Type of service:  Video Visit    Video End Time:2:41 PM    Originating Location (pt. Location): Home    Distant Location (provider location):  [facility]     Platform used for Video Visit: [employer]

## 2021-08-17 RX ORDER — VALACYCLOVIR HYDROCHLORIDE 1 G/1
2000 TABLET, FILM COATED ORAL 2 TIMES DAILY
Qty: 12 TABLET | Refills: 0 | OUTPATIENT
Start: 2021-08-17 | End: 2021-08-18

## 2021-10-03 ENCOUNTER — HEALTH MAINTENANCE LETTER (OUTPATIENT)
Age: 41
End: 2021-10-03

## 2021-10-14 ENCOUNTER — OFFICE VISIT (OUTPATIENT)
Dept: FAMILY MEDICINE | Facility: CLINIC | Age: 41
End: 2021-10-14
Payer: COMMERCIAL

## 2021-10-14 VITALS
HEIGHT: 69 IN | SYSTOLIC BLOOD PRESSURE: 100 MMHG | OXYGEN SATURATION: 97 % | TEMPERATURE: 97.6 F | DIASTOLIC BLOOD PRESSURE: 72 MMHG | WEIGHT: 183 LBS | BODY MASS INDEX: 27.11 KG/M2 | HEART RATE: 72 BPM | RESPIRATION RATE: 16 BRPM

## 2021-10-14 DIAGNOSIS — Z00.00 ROUTINE GENERAL MEDICAL EXAMINATION AT A HEALTH CARE FACILITY: Primary | ICD-10-CM

## 2021-10-14 DIAGNOSIS — R10.32 LLQ ABDOMINAL PAIN: ICD-10-CM

## 2021-10-14 LAB
ANION GAP SERPL CALCULATED.3IONS-SCNC: 7 MMOL/L (ref 3–14)
BUN SERPL-MCNC: 12 MG/DL (ref 7–30)
CALCIUM SERPL-MCNC: 9.2 MG/DL (ref 8.5–10.1)
CHLORIDE BLD-SCNC: 106 MMOL/L (ref 94–109)
CHOLEST SERPL-MCNC: 223 MG/DL
CO2 SERPL-SCNC: 25 MMOL/L (ref 20–32)
CREAT SERPL-MCNC: 1.1 MG/DL (ref 0.66–1.25)
FASTING STATUS PATIENT QL REPORTED: YES
GFR SERPL CREATININE-BSD FRML MDRD: 82 ML/MIN/1.73M2
GLUCOSE BLD-MCNC: 98 MG/DL (ref 70–99)
HDLC SERPL-MCNC: 39 MG/DL
LDLC SERPL CALC-MCNC: 155 MG/DL
NONHDLC SERPL-MCNC: 184 MG/DL
POTASSIUM BLD-SCNC: 4.6 MMOL/L (ref 3.4–5.3)
SODIUM SERPL-SCNC: 138 MMOL/L (ref 133–144)
TRIGL SERPL-MCNC: 144 MG/DL

## 2021-10-14 PROCEDURE — 99396 PREV VISIT EST AGE 40-64: CPT | Mod: 25 | Performed by: PHYSICIAN ASSISTANT

## 2021-10-14 PROCEDURE — 90686 IIV4 VACC NO PRSV 0.5 ML IM: CPT | Performed by: PHYSICIAN ASSISTANT

## 2021-10-14 PROCEDURE — 90471 IMMUNIZATION ADMIN: CPT | Performed by: PHYSICIAN ASSISTANT

## 2021-10-14 PROCEDURE — 80048 BASIC METABOLIC PNL TOTAL CA: CPT | Performed by: PHYSICIAN ASSISTANT

## 2021-10-14 PROCEDURE — 36415 COLL VENOUS BLD VENIPUNCTURE: CPT | Performed by: PHYSICIAN ASSISTANT

## 2021-10-14 PROCEDURE — 80061 LIPID PANEL: CPT | Performed by: PHYSICIAN ASSISTANT

## 2021-10-14 ASSESSMENT — ENCOUNTER SYMPTOMS
NAUSEA: 1
HEADACHES: 1
EYE PAIN: 0
DIZZINESS: 0
PALPITATIONS: 0
FREQUENCY: 0
HEMATURIA: 0
HEMATOCHEZIA: 0
FEVER: 0
DIARRHEA: 0
NERVOUS/ANXIOUS: 0
ABDOMINAL PAIN: 1
SHORTNESS OF BREATH: 0
JOINT SWELLING: 0
PARESTHESIAS: 0
ARTHRALGIAS: 0
MYALGIAS: 0
HEARTBURN: 0
CONSTIPATION: 0
CHILLS: 0
DYSURIA: 0
SORE THROAT: 0
WEAKNESS: 0
COUGH: 0

## 2021-10-14 ASSESSMENT — MIFFLIN-ST. JEOR: SCORE: 1707.52

## 2021-10-14 ASSESSMENT — PAIN SCALES - GENERAL: PAINLEVEL: NO PAIN (0)

## 2021-10-14 NOTE — PATIENT INSTRUCTIONS
Preventive Health Recommendations  Male Ages 40 to 49    Yearly exam:             See your health care provider every year in order to  o   Review health changes.   o   Discuss preventive care.    o   Review your medicines if your doctor has prescribed any.    You should be tested each year for STDs (sexually transmitted diseases) if you re at risk.     Have a cholesterol test every 5 years.     Have a colonoscopy (test for colon cancer) if someone in your family has had colon cancer or polyps before age 50.     After age 45, have a diabetes test (fasting glucose). If you are at risk for diabetes, you should have this test every 3 years.      Talk with your health care provider about whether or not a prostate cancer screening test (PSA) is right for you.    Shots: Get a flu shot each year. Get a tetanus shot every 10 years.     Nutrition:    Eat at least 5 servings of fruits and vegetables daily.     Eat whole-grain bread, whole-wheat pasta and brown rice instead of white grains and rice.     Get adequate Calcium and Vitamin D.     Lifestyle    Exercise for at least 150 minutes a week (30 minutes a day, 5 days a week). This will help you control your weight and prevent disease.     Limit alcohol to one drink per day.     No smoking.     Wear sunscreen to prevent skin cancer.     See your dentist every six months for an exam and cleaning.      Metamucil  Benefiber    Sodium - 2G or less    Patient Education     Eating a High-Fiber Diet  Fiber is what gives strength and structure to plants. Most grains, beans, vegetables, and fruits contain fiber. Foods rich in fiber are often low in calories and fat, and they fill you up more. They may also reduce your risks for certain health problems. To find out the amount of fiber in canned, packaged, or frozen foods, read the Nutrition Facts label. It tells you how much fiber is in one serving.    Types of fiber and their benefits  There are two types of fiber: insoluble and  soluble. They both aid digestion and help you maintain a healthy weight.    Insoluble fiber. This is found in whole grains, cereals, certain fruits and vegetables such as apple skin, corn, and carrots. Insoluble fiber may prevent constipation and reduce the risk for certain types of cancer. It's called insoluble because it doesn't dissolve in water.    Soluble fiber. This type of fiber is in oats, beans, and certain fruits and vegetables such as strawberries and peas. Soluble fiber can reduce cholesterol, which may help lower the risk for heart disease. It also helps control blood sugar levels.  Look for high-fiber foods  Try these foods to add fiber to your diet:    Whole-grain breads and cereals. Try to eat 6 to 8 ounces a day. Include wheat and oat bran cereals, whole-wheat muffins or toast, and corn tortillas in your meals.    Fruits. Try to eat 2 cups a day. Apples, oranges, strawberries, pears, and bananas are good sources. (Note: Fruit juice is low in fiber.)    Vegetables. Try to eat at least 2.5 cups a day. Add asparagus, carrots, broccoli, peas, and corn to your meals.    Beans. One cup of cooked lentils gives you over 15 grams of fiber. Try navy beans, lentils, and chickpeas.    Seeds. A small handful of seeds gives you about 3 grams of fiber. Try sunflower or lori seeds.  Keep track of your fiber  Keep track of how much fiber you eat. Start by reading food labels. Then eat a variety of foods high in fiber. As you start to eat more fiber, ask your healthcare provider how much water you should be drinking to keep your digestive system working smoothly.  Aim for a certain amount of fiber in your diet each day. The daily value for fiber is 25 grams a day. But some experts advise that women under 50 eat 25 to 28 grams per day and that men under 50 eat 30 to 38 grams per day. After age 50, your daily fiber needs drop to 22 grams for women and 28 grams for men.  Before you reach for the fiber supplements, think  about this. Fiber is found naturally in healthy whole foods. It gives you that feeling of fullness after you eat. Taking fiber supplements or eating fiber-enriched foods will not give you this full feeling.  Your fiber intake is a good measure for the quality of your overall diet. If you are missing out on your daily amount of fiber, you may be lacking other important nutrients as well.  [employer] last reviewed this educational content on 7/1/2019 2000-2021 [employer]. All rights reserved. This information is not intended as a substitute for professional medical care. Always follow your healthcare professional's instructions.

## 2021-10-14 NOTE — PROGRESS NOTES
SUBJECTIVE:   CC: Gagan Herrera is an 41 year old male who presents for preventative health visit.       Patient has been advised of split billing requirements and indicates understanding: Yes  Healthy Habits:     Getting at least 3 servings of Calcium per day:  Yes    Bi-annual eye exam:  NO    Dental care twice a year:  Yes    Sleep apnea or symptoms of sleep apnea:  None    Diet:  Regular (no restrictions)    Frequency of exercise:  None    Taking medications regularly:  Yes    Medication side effects:  None    PHQ-2 Total Score: 0    Additional concerns today:  Yes    Lower left abdomen discomfort   Low back discomfort after lifting heavy object in yard.    Lipid- FH in mom, grandfather cholesterol and BP    Today's PHQ-2 Score:   PHQ-2 (  [employer]) 10/14/2021   Q1: Little interest or pleasure in doing things 0   Q2: Feeling down, depressed or hopeless 0   PHQ-2 Score 0   Q1: Little interest or pleasure in doing things Not at all   Q2: Feeling down, depressed or hopeless Not at all   PHQ-2 Score 0       Abuse: Current or Past(Physical, Sexual or Emotional)- No  Do you feel safe in your environment? Yes    Have you ever done Advance Care Planning? (For example, a Health Directive, POLST, or a discussion with a medical provider or your loved ones about your wishes): No, advance care planning information given to patient to review.  Patient declined advance care planning discussion at this time.    Social History     Tobacco Use     Smoking status: Former Smoker     Types: Cigarettes     Quit date: 2013     Years since quittin.6     Smokeless tobacco: Former User     Quit date: 10/15/2013   Substance Use Topics     Alcohol use: Yes     Alcohol/week: 0.0 standard drinks     Comment: soc         Alcohol Use 10/14/2021   Prescreen: >3 drinks/day or >7 drinks/week? No   Prescreen: >3 drinks/day or >7 drinks/week? -   AUDIT SCORE  -       Last PSA: No results found for: PSA    Reviewed orders with patient.  "Reviewed health maintenance and updated orders accordingly - Yes  Labs reviewed in EPIC    Reviewed and updated as needed this visit by clinical staff  Tobacco  Allergies  Meds   Med Hx  Surg Hx  Fam Hx  Soc Hx        Reviewed and updated as needed this visit by Provider                    Review of Systems   Constitutional: Negative for chills and fever.   HENT: Positive for congestion. Negative for ear pain, hearing loss and sore throat.    Eyes: Negative for pain and visual disturbance.   Respiratory: Negative for cough and shortness of breath.    Cardiovascular: Negative for chest pain, palpitations and peripheral edema.   Gastrointestinal: Positive for abdominal pain and nausea. Negative for constipation, diarrhea, heartburn and hematochezia.   Genitourinary: Negative for discharge, dysuria, frequency, genital sores, hematuria, impotence and urgency.   Musculoskeletal: Negative for arthralgias, joint swelling and myalgias.   Skin: Negative for rash.   Neurological: Positive for headaches. Negative for dizziness, weakness and paresthesias.   Psychiatric/Behavioral: Negative for mood changes. The patient is not nervous/anxious.        OBJECTIVE:   /72 (BP Location: Right arm, Patient Position: Sitting, Cuff Size: Adult Regular)   Pulse 72   Temp 97.6  F (36.4  C) (Oral)   Resp 16   Ht 1.74 m (5' 8.5\")   Wt 83 kg (183 lb)   SpO2 97%   BMI 27.42 kg/m      Physical Exam  GENERAL: healthy, alert and no distress  EYES: Eyes grossly normal to inspection, PERRL and conjunctivae and sclerae normal  HENT: ear canals and TM's normal, nose and mouth without ulcers or lesions  NECK: no adenopathy, no asymmetry, masses, or scars and thyroid normal to palpation  RESP: lungs clear to auscultation - no rales, rhonchi or wheezes  CV: regular rate and rhythm, normal S1 S2, no S3 or S4, no murmur, click or rub, no peripheral edema and peripheral pulses strong  ABDOMEN: soft, nontender, no hepatosplenomegaly, no " "masses and bowel sounds normal  MS: no gross musculoskeletal defects noted, no edema  SKIN: no suspicious lesions or rashes  NEURO: Normal strength and tone, mentation intact and speech normal  PSYCH: mentation appears normal, affect normal/bright    Diagnostic Test Results:  Labs reviewed in Epic    ASSESSMENT/PLAN:   (Z00.00) Routine general medical examination at a health care facility  (primary encounter diagnosis)  Comment:   Plan: Lipid panel reflex to direct LDL Fasting, Basic        metabolic panel  (Ca, Cl, CO2, Creat, Gluc, K,         Na, BUN)        Check fasting labs today.    (R10.32) LLQ abdominal pain  Comment:   Plan: I did not note a hernia on exam today.  Discussed that it could be related to constipation and if so what he can do regarding diet.  Follow up if not improving or with any worsening.    Patient has been advised of split billing requirements and indicates understanding: Yes  COUNSELING:   Reviewed preventive health counseling, as reflected in patient instructions    Estimated body mass index is 27.42 kg/m  as calculated from the following:    Height as of this encounter: 1.74 m (5' 8.5\").    Weight as of this encounter: 83 kg (183 lb).     Weight management plan: Discussed healthy diet and exercise guidelines    He reports that he quit smoking about 8 years ago. His smoking use included cigarettes. He quit smokeless tobacco use about 8 years ago.      Counseling Resources:  ATP IV Guidelines  Pooled Cohorts Equation Calculator  FRAX Risk Assessment  ICSI Preventive Guidelines  Dietary Guidelines for Americans, 2010  USDA's MyPlate  ASA Prophylaxis  Lung CA Screening    MARK Wilson [facility]  "

## 2021-10-28 ENCOUNTER — TELEPHONE (OUTPATIENT)
Dept: FAMILY MEDICINE | Facility: CLINIC | Age: 41
End: 2021-10-28

## 2021-10-28 NOTE — TELEPHONE ENCOUNTER
Reason for call:  Form   Our goal is to have forms completed within 72 hours, however some forms may require a visit or additional information.     Who is the form from? Patient  Where did the form come from? Patient or family brought in     What clinic location was the form placed at? [facility]  Where was the form placed? Given to physician  What number is listed as a contact on the form? [phone]    Phone call message - patient request for a letter, form or note:     Date needed: as soon as possible  Please fax to [phone]  Has the patient signed a consent form for release of information? Not Applicable    Additional comments:     Type of letter, form or note: medical    Phone number to reach patient:  Home number on file [phone] (home)    Best Time:  anytime    Can we leave a detailed message on this number?  YES    Travel screening: Not Applicable

## 2021-10-30 DIAGNOSIS — F32.1 MODERATE MAJOR DEPRESSION (H): ICD-10-CM

## 2021-10-30 DIAGNOSIS — F41.9 ANXIETY: ICD-10-CM

## 2021-10-30 DIAGNOSIS — B00.1 RECURRENT COLD SORES: ICD-10-CM

## 2021-11-01 NOTE — TELEPHONE ENCOUNTER
Vitals input on form, contacted pt to let him know that he needs to sign form. Will come and sign form. Placed at .    Toyin Rachel-

## 2021-11-01 NOTE — TELEPHONE ENCOUNTER
Needs vials filled out still please.  Otherwise it is done and signed by me.    Also patient needs to sign this form.  Not sure if he knew that or has plans to pick it up so he can sign it.      Saji

## 2021-11-02 RX ORDER — VALACYCLOVIR HYDROCHLORIDE 500 MG/1
TABLET, FILM COATED ORAL
Qty: 90 TABLET | Refills: 2 | Status: SHIPPED | OUTPATIENT
Start: 2021-11-02 | End: 2022-12-01

## 2021-11-02 RX ORDER — SERTRALINE HYDROCHLORIDE 100 MG/1
TABLET, FILM COATED ORAL
Qty: 90 TABLET | Refills: 0 | Status: SHIPPED | OUTPATIENT
Start: 2021-11-02 | End: 2022-01-12

## 2021-11-02 NOTE — TELEPHONE ENCOUNTER
Prescription approved per [facility] Refill Protocol.  Benigno Esparza RN, BSN

## 2022-01-02 ENCOUNTER — E-VISIT (OUTPATIENT)
Dept: URGENT CARE | Facility: URGENT CARE | Age: 42
End: 2022-01-02
Payer: COMMERCIAL

## 2022-01-02 DIAGNOSIS — J20.9 ACUTE BRONCHITIS WITH SYMPTOMS > 10 DAYS: Primary | ICD-10-CM

## 2022-01-02 PROCEDURE — 99421 OL DIG E/M SVC 5-10 MIN: CPT | Performed by: PHYSICIAN ASSISTANT

## 2022-01-02 RX ORDER — BENZONATATE 200 MG/1
200 CAPSULE ORAL 3 TIMES DAILY PRN
Qty: 20 CAPSULE | Refills: 0 | Status: SHIPPED | OUTPATIENT
Start: 2022-01-02 | End: 2022-12-01

## 2022-01-02 RX ORDER — AZITHROMYCIN 250 MG/1
TABLET, FILM COATED ORAL
Qty: 6 TABLET | Refills: 0 | Status: SHIPPED | OUTPATIENT
Start: 2022-01-02 | End: 2022-01-07

## 2022-01-12 ENCOUNTER — MYC REFILL (OUTPATIENT)
Dept: PEDIATRICS | Facility: CLINIC | Age: 42
End: 2022-01-12
Payer: COMMERCIAL

## 2022-01-12 DIAGNOSIS — F32.1 MODERATE MAJOR DEPRESSION (H): ICD-10-CM

## 2022-01-12 DIAGNOSIS — F41.9 ANXIETY: ICD-10-CM

## 2022-01-14 NOTE — TELEPHONE ENCOUNTER
Routing refill request to provider for review/approval because:  This has not been prescribed by new provider    Lisa Samano RN

## 2022-01-17 RX ORDER — SERTRALINE HYDROCHLORIDE 100 MG/1
100 TABLET, FILM COATED ORAL DAILY
Qty: 90 TABLET | Refills: 1 | Status: SHIPPED | OUTPATIENT
Start: 2022-01-17 | End: 2022-07-13

## 2022-01-17 NOTE — TELEPHONE ENCOUNTER
I'm not sure why we didn't discuss at his physical but we did not.  Please send to PCP so subsequent refills will go to him then as well.    Saji

## 2022-01-17 NOTE — TELEPHONE ENCOUNTER
Routing refill request to provider for review/approval because:  See below.     Bernadette Du RN   [facility]  -- Triage Nurse

## 2022-07-07 ENCOUNTER — MYC REFILL (OUTPATIENT)
Dept: PEDIATRICS | Facility: CLINIC | Age: 42
End: 2022-07-07

## 2022-07-07 DIAGNOSIS — F41.9 ANXIETY: ICD-10-CM

## 2022-07-07 DIAGNOSIS — F32.1 MODERATE MAJOR DEPRESSION (H): ICD-10-CM

## 2022-07-12 RX ORDER — SERTRALINE HYDROCHLORIDE 100 MG/1
100 TABLET, FILM COATED ORAL DAILY
Qty: 90 TABLET | Refills: 0 | OUTPATIENT
Start: 2022-07-12

## 2022-07-12 NOTE — TELEPHONE ENCOUNTER
Duplicate. Refusing refill request and closing encounter.     Olivia Dela Cruz RN on 7/12/2022 at 11:10 AM

## 2022-07-12 NOTE — TELEPHONE ENCOUNTER
Routing refill request to provider for review/approval because:  Patient needs to be seen because:  due for a 6 month follow up  Abnormal PHQ9    Olivia Dela Cruz RN on 7/12/2022 at 11:10 AM

## 2022-07-13 RX ORDER — SERTRALINE HYDROCHLORIDE 100 MG/1
TABLET, FILM COATED ORAL
Qty: 90 TABLET | Refills: 0 | Status: SHIPPED | OUTPATIENT
Start: 2022-07-13 | End: 2022-08-08

## 2022-08-08 ENCOUNTER — VIRTUAL VISIT (OUTPATIENT)
Dept: FAMILY MEDICINE | Facility: CLINIC | Age: 42
End: 2022-08-08
Payer: COMMERCIAL

## 2022-08-08 DIAGNOSIS — B00.1 RECURRENT COLD SORES: ICD-10-CM

## 2022-08-08 DIAGNOSIS — F33.0 MILD RECURRENT MAJOR DEPRESSION (H): ICD-10-CM

## 2022-08-08 DIAGNOSIS — F41.9 ANXIETY: Primary | ICD-10-CM

## 2022-08-08 PROCEDURE — 96127 BRIEF EMOTIONAL/BEHAV ASSMT: CPT | Mod: 95 | Performed by: STUDENT IN AN ORGANIZED HEALTH CARE EDUCATION/TRAINING PROGRAM

## 2022-08-08 PROCEDURE — 99214 OFFICE O/P EST MOD 30 MIN: CPT | Mod: TEL | Performed by: STUDENT IN AN ORGANIZED HEALTH CARE EDUCATION/TRAINING PROGRAM

## 2022-08-08 RX ORDER — SERTRALINE HYDROCHLORIDE 100 MG/1
100 TABLET, FILM COATED ORAL DAILY
Qty: 90 TABLET | Refills: 3 | Status: SHIPPED | OUTPATIENT
Start: 2022-08-08 | End: 2023-06-27

## 2022-08-08 RX ORDER — VALACYCLOVIR HYDROCHLORIDE 500 MG/1
500 TABLET, FILM COATED ORAL DAILY
Qty: 90 TABLET | Refills: 3 | Status: SHIPPED | OUTPATIENT
Start: 2022-08-08 | End: 2023-06-23

## 2022-08-08 ASSESSMENT — ANXIETY QUESTIONNAIRES
5. BEING SO RESTLESS THAT IT IS HARD TO SIT STILL: NOT AT ALL
1. FEELING NERVOUS, ANXIOUS, OR ON EDGE: NOT AT ALL
GAD7 TOTAL SCORE: 0
6. BECOMING EASILY ANNOYED OR IRRITABLE: NOT AT ALL
7. FEELING AFRAID AS IF SOMETHING AWFUL MIGHT HAPPEN: NOT AT ALL
2. NOT BEING ABLE TO STOP OR CONTROL WORRYING: NOT AT ALL
3. WORRYING TOO MUCH ABOUT DIFFERENT THINGS: NOT AT ALL
GAD7 TOTAL SCORE: 0

## 2022-08-08 ASSESSMENT — PATIENT HEALTH QUESTIONNAIRE - PHQ9
5. POOR APPETITE OR OVEREATING: NOT AT ALL
SUM OF ALL RESPONSES TO PHQ QUESTIONS 1-9: 0

## 2022-08-08 NOTE — PROGRESS NOTES
"Gagan is a 41 year old who is being evaluated via a billable telephone visit.      What phone number would you like to be contacted at? [phone]  How would you like to obtain your AVS? MyChart    Assessment & Plan     Anxiety  Mild recurrent major depression (H)  Has been on sertraline, 100 mg for years for anger/frustration. Ran out of medication about one week ago. PHQ9 and GAD7 score of 0 today. He notes significant improvement in frustration and anger and some stress while on medication and less due to general depression symptoms. Will refill today.  - sertraline (ZOLOFT) 100 MG tablet  Dispense: 90 tablet; Refill: 3    Recurrent cold sores  Has 7 or 8 cold sore outbreaks a year. Has had good success with suppressive valacyclovir in past decreasing outbreaks to virtually none. Last BMP WNL on 10/2021. Will refill for patient.   - valACYclovir (VALTREX) 500 MG tablet  Dispense: 90 tablet; Refill: 3    Follow up preventative visit in 2-3 months.    BMI:   Estimated body mass index is 27.42 kg/m  as calculated from the following:    Height as of 10/14/21: 1.74 m (5' 8.5\").    Weight as of 10/14/21: 83 kg (183 lb).     Return in about 2 months (around 10/8/2022) for Routine preventive.    Ming Madera MD  [facility]  8/8/2022      Subjective      Gagan is a 41 year old presenting for the following health issues:    Recheck Medication    HPI     Medication Followup of Zoloft and Valtrex     Taking Medication as prescribed: yes    Side Effects:  None    Medication Helping Symptoms:  yes    Daily acyclovir and sertraline    Has been out of the medications -   Reached out to someone but can't do annual physical.  Due to HSA and finances.    Mood  Has been out of the sertraline for the past week  Feeling a little nauseated and with some headaches  Taking for anxiety more than depression.  Was mostly for anger and frustration too  Has been very helpful in the past - has gone few years " without taking and notices the difference a lot  Really is beneficial for him.  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    Physical Exam   N/A with phone call        Phone call duration: 22 minutes

## 2022-09-04 ENCOUNTER — HEALTH MAINTENANCE LETTER (OUTPATIENT)
Age: 42
End: 2022-09-04

## 2022-10-17 NOTE — TELEPHONE ENCOUNTER
7/2/19    Pt is traveling tonight,, having a lot of anxiety, would like a RX for   Ativan. Please call Pt to let him know this is complete so he can get this before he leaves this afternoon @ [phone]   Daily Fractionated Dose (Optional- Include Units): 266.76 cGy

## 2022-11-19 ENCOUNTER — E-VISIT (OUTPATIENT)
Dept: FAMILY MEDICINE | Facility: CLINIC | Age: 42
End: 2022-11-19
Payer: COMMERCIAL

## 2022-11-19 DIAGNOSIS — B00.1 RECURRENT COLD SORES: Primary | ICD-10-CM

## 2022-11-19 PROCEDURE — 99421 OL DIG E/M SVC 5-10 MIN: CPT | Performed by: PHYSICIAN ASSISTANT

## 2022-11-21 RX ORDER — VALACYCLOVIR HYDROCHLORIDE 1 G/1
2000 TABLET, FILM COATED ORAL 2 TIMES DAILY
Qty: 4 TABLET | Refills: 0 | Status: SHIPPED | OUTPATIENT
Start: 2022-11-21 | End: 2022-12-01

## 2022-12-01 ENCOUNTER — OFFICE VISIT (OUTPATIENT)
Dept: FAMILY MEDICINE | Facility: CLINIC | Age: 42
End: 2022-12-01
Payer: COMMERCIAL

## 2022-12-01 VITALS
DIASTOLIC BLOOD PRESSURE: 72 MMHG | RESPIRATION RATE: 19 BRPM | HEART RATE: 84 BPM | SYSTOLIC BLOOD PRESSURE: 100 MMHG | HEIGHT: 69 IN | BODY MASS INDEX: 26.97 KG/M2 | WEIGHT: 182.1 LBS | OXYGEN SATURATION: 96 % | TEMPERATURE: 98.5 F

## 2022-12-01 DIAGNOSIS — Z11.59 NEED FOR HEPATITIS C SCREENING TEST: ICD-10-CM

## 2022-12-01 DIAGNOSIS — E78.00 PURE HYPERCHOLESTEROLEMIA: ICD-10-CM

## 2022-12-01 DIAGNOSIS — F41.9 ANXIETY: ICD-10-CM

## 2022-12-01 DIAGNOSIS — B00.1 RECURRENT COLD SORES: ICD-10-CM

## 2022-12-01 DIAGNOSIS — Z00.00 ROUTINE GENERAL MEDICAL EXAMINATION AT A HEALTH CARE FACILITY: Primary | ICD-10-CM

## 2022-12-01 LAB
CHOLEST SERPL-MCNC: 281 MG/DL
HDLC SERPL-MCNC: 47 MG/DL
LDLC SERPL CALC-MCNC: 209 MG/DL
NONHDLC SERPL-MCNC: 234 MG/DL
TRIGL SERPL-MCNC: 123 MG/DL

## 2022-12-01 PROCEDURE — 99396 PREV VISIT EST AGE 40-64: CPT | Performed by: PHYSICIAN ASSISTANT

## 2022-12-01 PROCEDURE — 80048 BASIC METABOLIC PNL TOTAL CA: CPT | Performed by: PHYSICIAN ASSISTANT

## 2022-12-01 PROCEDURE — 86803 HEPATITIS C AB TEST: CPT | Performed by: PHYSICIAN ASSISTANT

## 2022-12-01 PROCEDURE — 36415 COLL VENOUS BLD VENIPUNCTURE: CPT | Performed by: PHYSICIAN ASSISTANT

## 2022-12-01 PROCEDURE — 80061 LIPID PANEL: CPT | Performed by: PHYSICIAN ASSISTANT

## 2022-12-01 ASSESSMENT — ENCOUNTER SYMPTOMS
DYSURIA: 0
DIZZINESS: 0
HEMATOCHEZIA: 0
HEADACHES: 0
HEMATURIA: 0
MYALGIAS: 0
PALPITATIONS: 0
PARESTHESIAS: 0
JOINT SWELLING: 0
DIARRHEA: 0
ARTHRALGIAS: 0
NERVOUS/ANXIOUS: 0
EYE PAIN: 0
FEVER: 0
ABDOMINAL PAIN: 0
NAUSEA: 0
FREQUENCY: 0
CONSTIPATION: 0
WEAKNESS: 0
SHORTNESS OF BREATH: 0
CHILLS: 0
HEARTBURN: 0
SORE THROAT: 0
COUGH: 0

## 2022-12-01 ASSESSMENT — PAIN SCALES - GENERAL: PAINLEVEL: NO PAIN (0)

## 2022-12-01 NOTE — PROGRESS NOTES
SUBJECTIVE:   CC: Gagan is an 42 year old who presents for preventative health visit.     Patient has been advised of split billing requirements and indicates understanding: Yes  Healthy Habits:     Getting at least 3 servings of Calcium per day:  NO    Bi-annual eye exam:  NO    Dental care twice a year:  NO    Sleep apnea or symptoms of sleep apnea:  None    Diet:  Regular (no restrictions)    Frequency of exercise:  None    Taking medications regularly:  Yes    Medication side effects:  None    PHQ-2 Total Score: 0    Additional concerns today:  No    Gagan Herrera is a 42 year old male who presents today for annual check up  Feels well overall    Today's PHQ-2 Score:   PHQ-2 (  [employer]) 2022   Q1: Little interest or pleasure in doing things 0   Q2: Feeling down, depressed or hopeless 0   PHQ-2 Score 0   PHQ-2 Total Score (12-17 Years)- Positive if 3 or more points; Administer PHQ-A if positive -   Q1: Little interest or pleasure in doing things Not at all   Q2: Feeling down, depressed or hopeless Not at all   PHQ-2 Score 0       Social History     Tobacco Use     Smoking status: Former     Types: Cigarettes     Quit date: 2013     Years since quittin.7     Smokeless tobacco: Former     Quit date: 10/15/2013   Substance Use Topics     Alcohol use: Yes     Alcohol/week: 0.0 standard drinks     Comment: soc     If you drink alcohol do you typically have >3 drinks per day or >7 drinks per week? Yes      Alcohol Use 2022   Prescreen: >3 drinks/day or >7 drinks/week? Yes   Prescreen: >3 drinks/day or >7 drinks/week? -   AUDIT SCORE  4     AUDIT - Alcohol Use Disorders Identification Test - Reproduced from the World Health Organization Audit 2001 (Second Edition) 2022   1.  How often do you have a drink containing alcohol? 2 to 3 times a week   2.  How many drinks containing alcohol do you have on a typical day when you are drinking? 1 or 2   3.  How often do you have five or more drinks  on one occasion? Less than monthly   4.  How often during the last year have you found that you were not able to stop drinking once you had started? Never   5.  How often during the last year have you failed to do what was normally expected of you because of drinking? Never   6.  How often during the last year have you needed a first drink in the morning to get yourself going after a heavy drinking session? Never   7.  How often during the last year have you had a feeling of guilt or remorse after drinking? Never   8.  How often during the last year have you been unable to remember what happened the night before because of your drinking? Never   9.  Have you or someone else been injured because of your drinking? No   10. Has a relative, friend, doctor or other health care worker been concerned about your drinking or suggested you cut down? No   TOTAL SCORE 4   Reviewed; ok    Last PSA: No results found for: PSA    Reviewed orders with patient. Reviewed health maintenance and updated orders accordingly - Yes  Lab work is in process  Labs reviewed in EPIC    Reviewed and updated as needed this visit by clinical staff   Tobacco  Allergies  Meds     [facility]          Reviewed and updated as needed this visit by Provider     Meds     [facility]             Review of Systems   Constitutional: Negative for chills and fever.   HENT: Positive for congestion. Negative for ear pain, hearing loss and sore throat.    Eyes: Negative for pain and visual disturbance.   Respiratory: Negative for cough and shortness of breath.    Cardiovascular: Negative for chest pain, palpitations and peripheral edema.   Gastrointestinal: Negative for abdominal pain, constipation, diarrhea, heartburn, hematochezia and nausea.   Genitourinary: Negative for dysuria, frequency, genital sores, hematuria and urgency.   Musculoskeletal: Negative for arthralgias, joint swelling and myalgias.   Skin: Negative for rash.   Neurological: Negative for  "dizziness, weakness, headaches and paresthesias.   Psychiatric/Behavioral: Negative for mood changes. The patient is not nervous/anxious.        OBJECTIVE:   /72 (BP Location: Right arm, Patient Position: Sitting, Cuff Size: Adult Regular)   Pulse 84   Temp 98.5  F (36.9  C) (Oral)   Resp 19   Ht 1.76 m (5' 9.3\")   Wt 82.6 kg (182 lb 1.6 oz)   SpO2 96%   BMI 26.66 kg/m      Physical Exam  GENERAL: healthy, alert and no distress  EYES: Eyes grossly normal to inspection, PERRL and conjunctivae and sclerae normal  HENT: ear canals and TM's normal, nose and mouth without ulcers or lesions  NECK: no adenopathy, no asymmetry, masses, or scars and thyroid normal to palpation  RESP: lungs clear to auscultation - no rales, rhonchi or wheezes  CV: regular rate and rhythm, normal S1 S2, no S3 or S4, no murmur, click or rub, no peripheral edema and peripheral pulses strong  ABDOMEN: soft, nontender, no hepatosplenomegaly, no masses and bowel sounds normal  MS: no gross musculoskeletal defects noted, no edema  SKIN: no suspicious lesions or rashes  PSYCH: mentation appears normal, affect normal/bright    Diagnostic Test Results:  Labs reviewed in Epic    ASSESSMENT/PLAN:   1. Routine general medical examination at a health care facility  Reviewed personal and family history. Reviewed age appropriate screenings. Recommended any needed vaccinations - he defers today    2. Pure hypercholesterolemia  Monitor. Improve diet/exercise  - Lipid panel reflex to direct LDL Fasting; Future    3. Need for hepatitis C screening test  Per cdc  - Hepatitis C Screen Reflex to HCV RNA Quant and Genotype; Future    4. Recurrent cold sores  Controlled. Previously met with Dr. Madera about this and has enough meds      5. Anxiety  Controlled. Previously met with Dr. Madera about this and has enough meds            COUNSELING:   Reviewed preventive health counseling, as reflected in patient instructions        He reports that he quit smoking " about 9 years ago. His smoking use included cigarettes. He quit smokeless tobacco use about 9 years ago.            MARK Rosario [facility]

## 2022-12-02 LAB
ANION GAP SERPL CALCULATED.3IONS-SCNC: 18 MMOL/L (ref 7–15)
BUN SERPL-MCNC: 16.3 MG/DL (ref 6–20)
CALCIUM SERPL-MCNC: 10.2 MG/DL (ref 8.6–10)
CHLORIDE SERPL-SCNC: 102 MMOL/L (ref 98–107)
CREAT SERPL-MCNC: 1.18 MG/DL (ref 0.67–1.17)
DEPRECATED HCO3 PLAS-SCNC: 19 MMOL/L (ref 22–29)
GFR SERPL CREATININE-BSD FRML MDRD: 78 ML/MIN/1.73M2
GLUCOSE SERPL-MCNC: 100 MG/DL (ref 70–99)
HCV AB SERPL QL IA: NONREACTIVE
POTASSIUM SERPL-SCNC: 5.2 MMOL/L (ref 3.4–5.3)
SODIUM SERPL-SCNC: 139 MMOL/L (ref 136–145)

## 2022-12-04 DIAGNOSIS — E78.00 PURE HYPERCHOLESTEROLEMIA: ICD-10-CM

## 2022-12-04 DIAGNOSIS — R79.89 ELEVATED SERUM CREATININE: Primary | ICD-10-CM

## 2022-12-16 ENCOUNTER — TELEPHONE (OUTPATIENT)
Dept: FAMILY MEDICINE | Facility: CLINIC | Age: 42
End: 2022-12-16

## 2022-12-16 ENCOUNTER — LAB (OUTPATIENT)
Dept: LAB | Facility: CLINIC | Age: 42
End: 2022-12-16
Payer: COMMERCIAL

## 2022-12-16 DIAGNOSIS — E78.00 PURE HYPERCHOLESTEROLEMIA: ICD-10-CM

## 2022-12-16 DIAGNOSIS — R79.89 ELEVATED SERUM CREATININE: ICD-10-CM

## 2022-12-16 LAB
ANION GAP SERPL CALCULATED.3IONS-SCNC: 11 MMOL/L (ref 7–15)
BUN SERPL-MCNC: 14.1 MG/DL (ref 6–20)
CALCIUM SERPL-MCNC: 9.5 MG/DL (ref 8.6–10)
CHLORIDE SERPL-SCNC: 100 MMOL/L (ref 98–107)
CHOLEST SERPL-MCNC: 182 MG/DL
CREAT SERPL-MCNC: 1.13 MG/DL (ref 0.67–1.17)
DEPRECATED HCO3 PLAS-SCNC: 26 MMOL/L (ref 22–29)
GFR SERPL CREATININE-BSD FRML MDRD: 82 ML/MIN/1.73M2
GLUCOSE SERPL-MCNC: 112 MG/DL (ref 70–99)
HDLC SERPL-MCNC: 44 MG/DL
LDLC SERPL CALC-MCNC: 125 MG/DL
NONHDLC SERPL-MCNC: 138 MG/DL
POTASSIUM SERPL-SCNC: 4.4 MMOL/L (ref 3.4–5.3)
SODIUM SERPL-SCNC: 137 MMOL/L (ref 136–145)
TRIGL SERPL-MCNC: 66 MG/DL

## 2022-12-16 PROCEDURE — 36415 COLL VENOUS BLD VENIPUNCTURE: CPT

## 2022-12-16 PROCEDURE — 80048 BASIC METABOLIC PNL TOTAL CA: CPT

## 2022-12-16 PROCEDURE — 80061 LIPID PANEL: CPT

## 2022-12-16 NOTE — TELEPHONE ENCOUNTER
Patient calling concerning his lab appt for today. Patient states that he has been fasting for this lab appointment like the one before but is concerned that he will run into the same problem as before with the extended fasting time.    Per Mark Davies's last note on the lab results, patient was to have a repeat lab non-fasting for the next appointment.     Patient will eat before the appointment but wanted to pass along message to Mark that over the last 13 hours has been fasting by mistake. Patient advised that he is good to eat for this appointment.    Gagan Ozuna RN on 12/16/2022 at 12:07 PM

## 2023-01-31 ENCOUNTER — OFFICE VISIT (OUTPATIENT)
Dept: URGENT CARE | Facility: URGENT CARE | Age: 43
End: 2023-01-31
Payer: COMMERCIAL

## 2023-01-31 VITALS
OXYGEN SATURATION: 98 % | DIASTOLIC BLOOD PRESSURE: 76 MMHG | TEMPERATURE: 98 F | SYSTOLIC BLOOD PRESSURE: 120 MMHG | HEART RATE: 78 BPM | RESPIRATION RATE: 20 BRPM

## 2023-01-31 DIAGNOSIS — R35.0 URINARY FREQUENCY: ICD-10-CM

## 2023-01-31 DIAGNOSIS — N10 ACUTE PYELONEPHRITIS: Primary | ICD-10-CM

## 2023-01-31 LAB
ALBUMIN UR-MCNC: 100 MG/DL
APPEARANCE UR: CLEAR
BACTERIA #/AREA URNS HPF: ABNORMAL /HPF
BILIRUB UR QL STRIP: ABNORMAL
COLOR UR AUTO: YELLOW
GLUCOSE UR STRIP-MCNC: NEGATIVE MG/DL
HGB UR QL STRIP: ABNORMAL
KETONES UR STRIP-MCNC: 15 MG/DL
LEUKOCYTE ESTERASE UR QL STRIP: ABNORMAL
NITRATE UR QL: NEGATIVE
PH UR STRIP: 7 [PH] (ref 5–7)
RBC #/AREA URNS AUTO: ABNORMAL /HPF
SP GR UR STRIP: 1.02 (ref 1–1.03)
UROBILINOGEN UR STRIP-ACNC: 2 E.U./DL
WBC #/AREA URNS AUTO: ABNORMAL /HPF
WBC CLUMPS #/AREA URNS HPF: PRESENT /HPF

## 2023-01-31 PROCEDURE — 99214 OFFICE O/P EST MOD 30 MIN: CPT | Mod: 25 | Performed by: PHYSICIAN ASSISTANT

## 2023-01-31 PROCEDURE — 96372 THER/PROPH/DIAG INJ SC/IM: CPT | Performed by: PHYSICIAN ASSISTANT

## 2023-01-31 PROCEDURE — 87591 N.GONORRHOEAE DNA AMP PROB: CPT | Performed by: PHYSICIAN ASSISTANT

## 2023-01-31 PROCEDURE — 87186 SC STD MICRODIL/AGAR DIL: CPT | Performed by: PHYSICIAN ASSISTANT

## 2023-01-31 PROCEDURE — 87088 URINE BACTERIA CULTURE: CPT | Performed by: PHYSICIAN ASSISTANT

## 2023-01-31 PROCEDURE — 81001 URINALYSIS AUTO W/SCOPE: CPT | Performed by: PHYSICIAN ASSISTANT

## 2023-01-31 PROCEDURE — 87086 URINE CULTURE/COLONY COUNT: CPT | Performed by: PHYSICIAN ASSISTANT

## 2023-01-31 PROCEDURE — 87491 CHLMYD TRACH DNA AMP PROBE: CPT | Performed by: PHYSICIAN ASSISTANT

## 2023-01-31 RX ORDER — CEFTRIAXONE SODIUM 1 G
1 VIAL (EA) INJECTION ONCE
Status: COMPLETED | OUTPATIENT
Start: 2023-01-31 | End: 2023-01-31

## 2023-01-31 RX ORDER — CEFDINIR 300 MG/1
300 CAPSULE ORAL 2 TIMES DAILY
Qty: 20 CAPSULE | Refills: 0 | Status: SHIPPED | OUTPATIENT
Start: 2023-01-31 | End: 2023-02-10

## 2023-01-31 RX ADMIN — Medication 1 G: at 13:04

## 2023-01-31 NOTE — PROGRESS NOTES
SUBJECTIVE:   Gagan Herrera is a 42 year old male who  presents today for a possible UTI. Symptoms of dysuria, frequency, burning, suprapubic pain and pressure, nausea and chills have been going on for 3day(s).  Hematuria no.  sudden onsetand moderate.  There is no history of fever, or vomiting.  No history of vaginal or penile discharge. This patient does have a history of urinary tract infections. Patient denies rigors, flank pain, temperature > 101 degrees F., Vomiting, significant nausea or diarrhea, taking Coumadin and GFR less than 30 within the last year or discharge. No anal insertive intercourse.     Past Medical History:   Diagnosis Date     STD (sexually transmitted disease)      Current Outpatient Medications   Medication Sig Dispense Refill     cefdinir (OMNICEF) 300 MG capsule Take 1 capsule (300 mg) by mouth 2 times daily for 10 days 20 capsule 0     mometasone (ELOCON) 0.1 % external cream Apply topically daily as needed (eczema) 60 g 2     sertraline (ZOLOFT) 100 MG tablet Take 1 tablet (100 mg) by mouth daily 90 tablet 3     valACYclovir (VALTREX) 500 MG tablet Take 1 tablet (500 mg) by mouth daily 90 tablet 3     Social History     Tobacco Use     Smoking status: Former     Types: Cigarettes     Quit date: 2013     Years since quittin.9     Smokeless tobacco: Former     Quit date: 10/15/2013   Substance Use Topics     Alcohol use: Yes     Alcohol/week: 0.0 standard drinks     Comment: soc       ROS:   Review of systems negative except as stated above.    OBJECTIVE:  /76   Pulse 78   Temp 98  F (36.7  C) (Tympanic)   Resp 20   SpO2 98%   GENERAL APPEARANCE: healthy, alert and no distress  RESP: lungs clear to auscultation - no rales, rhonchi or wheezes  CV: regular rates and rhythm, normal S1 S2, no murmur noted  BACK: No CVA tenderness  NEURO: Normal strength and tone, sensory exam grossly normal,  normal speech and mentation      Results for orders placed or performed in visit  on 01/31/23   UA reflex to Microscopic and Culture     Status: Abnormal    Specimen: Urine, Clean Catch   Result Value Ref Range    Color Urine Yellow Colorless, Straw, Light Yellow, Yellow    Appearance Urine Clear Clear    Glucose Urine Negative Negative mg/dL    Bilirubin Urine Small (A) Negative    Ketones Urine 15 (A) Negative mg/dL    Specific Gravity Urine 1.020 1.003 - 1.035    Blood Urine Trace (A) Negative    pH Urine 7.0 5.0 - 7.0    Protein Albumin Urine 100 (A) Negative mg/dL    Urobilinogen Urine 2.0 (A) 0.2, 1.0 E.U./dL    Nitrite Urine Negative Negative    Leukocyte Esterase Urine Moderate (A) Negative   Urine Microscopic Exam     Status: Abnormal   Result Value Ref Range    Bacteria Urine Few (A) None Seen /HPF    RBC Urine 2-5 (A) 0-2 /HPF /HPF    WBC Urine 25-50 (A) 0-5 /HPF /HPF    WBC Clumps Urine Present (A) None Seen /HPF       ASSESSMENT:   (N10) Acute pyelonephritis  (primary encounter diagnosis)  Plan: cefTRIAXone (ROCEPHIN) in NS for IM         administration 1 g, cefdinir (OMNICEF) 300 MG         capsule       (R35.0) Urinary frequency  Plan: UA reflex to Microscopic and Culture, Chlamydia        trachomatis PCR, Neisseria gonorrhoeae PCR,         Urine Microscopic Exam, Urine Culture, Urine         Culture Aerobic Bacterial - lab collect      Patient Instructions   Ensure no worsening of symptoms - fever, vomiting, sense of illness    If these occur you should be assessed right away

## 2023-01-31 NOTE — PATIENT INSTRUCTIONS
Ensure no worsening of symptoms - fever, vomiting, sense of illness    If these occur you should be assessed right away

## 2023-02-01 LAB
BACTERIA UR CULT: ABNORMAL
C TRACH DNA SPEC QL NAA+PROBE: NEGATIVE
N GONORRHOEA DNA SPEC QL NAA+PROBE: NEGATIVE

## 2023-03-10 ENCOUNTER — TRANSFERRED RECORDS (OUTPATIENT)
Dept: HEALTH INFORMATION MANAGEMENT | Facility: CLINIC | Age: 43
End: 2023-03-10

## 2023-03-19 ENCOUNTER — E-VISIT (OUTPATIENT)
Dept: FAMILY MEDICINE | Facility: CLINIC | Age: 43
End: 2023-03-19
Payer: COMMERCIAL

## 2023-03-19 DIAGNOSIS — R35.0 URINARY FREQUENCY: Primary | ICD-10-CM

## 2023-03-19 PROCEDURE — 99207 PR NO CHARGE LOS: CPT | Performed by: STUDENT IN AN ORGANIZED HEALTH CARE EDUCATION/TRAINING PROGRAM

## 2023-03-19 NOTE — PATIENT INSTRUCTIONS
Thank you for choosing us for your care. Based on the information provided, I believe you need to be seen in person at Urgent Care to discuss symptoms and obtain lab work for potential UTI, prostate or kidney infection.    You will not be charged for this eVisit.

## 2023-06-09 DIAGNOSIS — F33.0 MILD RECURRENT MAJOR DEPRESSION (H): ICD-10-CM

## 2023-06-09 DIAGNOSIS — F41.9 ANXIETY: ICD-10-CM

## 2023-06-09 RX ORDER — SERTRALINE HYDROCHLORIDE 100 MG/1
TABLET, FILM COATED ORAL
Qty: 90 TABLET | Refills: 3 | OUTPATIENT
Start: 2023-06-09

## 2023-06-18 DIAGNOSIS — F33.0 MILD RECURRENT MAJOR DEPRESSION (H): ICD-10-CM

## 2023-06-18 DIAGNOSIS — F41.9 ANXIETY: ICD-10-CM

## 2023-06-20 RX ORDER — SERTRALINE HYDROCHLORIDE 100 MG/1
TABLET, FILM COATED ORAL
Qty: 90 TABLET | Refills: 3 | OUTPATIENT
Start: 2023-06-20

## 2023-06-21 DIAGNOSIS — B00.1 RECURRENT COLD SORES: ICD-10-CM

## 2023-06-23 RX ORDER — VALACYCLOVIR HYDROCHLORIDE 500 MG/1
TABLET, FILM COATED ORAL
Qty: 90 TABLET | Refills: 1 | Status: SHIPPED | OUTPATIENT
Start: 2023-06-23 | End: 2023-12-18

## 2023-06-23 NOTE — TELEPHONE ENCOUNTER
Prescription approved per [facility] Refill Protocol.  Ana Maria GILL RN, BSN

## 2023-06-27 ENCOUNTER — MYC MEDICAL ADVICE (OUTPATIENT)
Dept: FAMILY MEDICINE | Facility: CLINIC | Age: 43
End: 2023-06-27
Payer: COMMERCIAL

## 2023-06-27 DIAGNOSIS — F33.0 MILD RECURRENT MAJOR DEPRESSION (H): ICD-10-CM

## 2023-06-27 DIAGNOSIS — F41.9 ANXIETY: ICD-10-CM

## 2023-06-27 RX ORDER — SERTRALINE HYDROCHLORIDE 100 MG/1
100 TABLET, FILM COATED ORAL DAILY
Qty: 90 TABLET | Refills: 0 | Status: SHIPPED | OUTPATIENT
Start: 2023-06-27 | End: 2023-09-21

## 2023-06-27 NOTE — TELEPHONE ENCOUNTER
Prescription sent 8/8/22, qty: 90 with 3 refills.  Should last until 8/8/23.    Called [facility], spoke to Ginger pharmacist.  Pt exhausted prescription.  Picked up on:    8/8/22  10/31/22  1/8/22  3/28/22    Jewell Davey RN, BSN  [facility]

## 2023-06-27 NOTE — TELEPHONE ENCOUNTER
Routing refill request to provider for review/approval because:  Labs not current:  PHQ-9    Yara SHEETS RN, BSN, PHN

## 2023-09-21 DIAGNOSIS — F41.9 ANXIETY: ICD-10-CM

## 2023-09-21 DIAGNOSIS — F33.0 MILD RECURRENT MAJOR DEPRESSION (H): ICD-10-CM

## 2023-09-21 RX ORDER — SERTRALINE HYDROCHLORIDE 100 MG/1
100 TABLET, FILM COATED ORAL DAILY
Qty: 90 TABLET | Refills: 0 | Status: SHIPPED | OUTPATIENT
Start: 2023-09-21 | End: 2023-12-18

## 2023-09-21 NOTE — TELEPHONE ENCOUNTER
Routing refill request to provider for review/approval because:  Patient needs to be seen because:  due for 6 month follow up  Outdated PHQ-9      11/19/2020     4:23 PM 8/16/2021     2:13 PM 8/8/2022     8:34 AM   PHQ   PHQ-9 Total Score 5 0 0   Q9: Thoughts of better off dead/self-harm past 2 weeks Not at all Not at all Not at all    Jewell Davey RN, BSN  [facility]

## 2023-09-21 NOTE — TELEPHONE ENCOUNTER
Sent [employer] message requesting a call back for an appt. Two more attempts will be made.    Ilana Hall  Lead

## 2023-09-21 NOTE — LETTER
October 5, 2023      Gagan Herrera  [address] [address] [address]        [place],    We recently received a call from your pharmacy requesting a refill request for your medication sertraline. We are contacting you today to notify you that you are due for an OFFICE VISIT for further refills.     We have authorized a one time refill of your medication to allow time for you to schedule your appointment.     Please call [phone] to schedule an appointment or if you have MyChart you can schedule with your provider as well.     Taking care of your health is important to us, and ongoing visits with your provider are vital to your care. We look forward to seeing you in the near future.     Thank you for using [facility] for your medical needs.     Sincerely,        David Davies PA-C

## 2023-12-17 DIAGNOSIS — B00.1 RECURRENT COLD SORES: ICD-10-CM

## 2023-12-17 DIAGNOSIS — F33.0 MILD RECURRENT MAJOR DEPRESSION (H): ICD-10-CM

## 2023-12-17 DIAGNOSIS — F41.9 ANXIETY: ICD-10-CM

## 2023-12-18 RX ORDER — SERTRALINE HYDROCHLORIDE 100 MG/1
100 TABLET, FILM COATED ORAL DAILY
Qty: 30 TABLET | Refills: 0 | Status: SHIPPED | OUTPATIENT
Start: 2023-12-18

## 2023-12-18 RX ORDER — VALACYCLOVIR HYDROCHLORIDE 500 MG/1
TABLET, FILM COATED ORAL
Qty: 90 TABLET | Refills: 0 | Status: SHIPPED | OUTPATIENT
Start: 2023-12-18

## 2023-12-18 NOTE — TELEPHONE ENCOUNTER
Medication is being filled for 1 time refill only due to:  Patient needs labs Cr.  Jewell Davey RN, BSN  [facility]

## 2024-01-12 DIAGNOSIS — F41.9 ANXIETY: ICD-10-CM

## 2024-01-12 DIAGNOSIS — F33.0 MILD RECURRENT MAJOR DEPRESSION (H): ICD-10-CM

## 2024-01-12 NOTE — TELEPHONE ENCOUNTER
Fax via pharmacy is requesting a 90 day refill of:    sertraline (ZOLOFT) 100 MG tablet 30 tablet 0 12/18/2023 -- No   Sig - Route: TAKE 1 TABLET BY MOUTH EVERY DAY - Oral

## 2024-01-12 NOTE — LETTER
January 26, 2024      Gagan Herrera  [address] [address] [address]      [place],    We recently received a call from your pharmacy requesting a refill request for your medication sertraline (ZOLOFT) 100 MG tablet . We are contacting you today to notify you that you are due for an appointment for further refills.     We have authorized a one time refill of your medication to allow time for you to schedule your appointment. This appointment can be in clinic or virtual, by telephone or video.    Please call [phone] to schedule an appointment or if you have MyChart you can schedule with your provider as well.     Taking care of your health is important to us, and ongoing visits with your provider are vital to your care. We look forward to seeing you in the near future.     Thank you for using [facility] for your medical needs.     Gini Mueller           Sincerely,        David Davies PA-C

## 2024-01-13 DIAGNOSIS — F41.9 ANXIETY: ICD-10-CM

## 2024-01-13 DIAGNOSIS — F33.0 MILD RECURRENT MAJOR DEPRESSION (H): ICD-10-CM

## 2024-01-15 RX ORDER — SERTRALINE HYDROCHLORIDE 100 MG/1
100 TABLET, FILM COATED ORAL DAILY
Qty: 30 TABLET | Refills: 0 | OUTPATIENT
Start: 2024-01-15

## 2024-01-15 RX ORDER — SERTRALINE HYDROCHLORIDE 100 MG/1
100 TABLET, FILM COATED ORAL DAILY
Qty: 30 TABLET | Refills: 0 | Status: CANCELLED | OUTPATIENT
Start: 2024-01-15

## 2024-01-15 NOTE — TELEPHONE ENCOUNTER
Called patient at the request of PCP in regards to medication refills. No answer, lmtcb and schedule either in clinic, or virtual/phone visit for any further refills.     Please assist patient in scheduling a med check appoinment.     Vy Waddell MA

## 2024-01-19 NOTE — TELEPHONE ENCOUNTER
Sent [employer] message requesting a call back for an appt. 1 more attempt will be made.     Soraya Mueller

## 2024-02-18 ENCOUNTER — HEALTH MAINTENANCE LETTER (OUTPATIENT)
Age: 44
End: 2024-02-18

## 2024-03-13 DIAGNOSIS — B00.1 RECURRENT COLD SORES: ICD-10-CM

## 2024-03-14 RX ORDER — VALACYCLOVIR HYDROCHLORIDE 500 MG/1
TABLET, FILM COATED ORAL
Qty: 30 TABLET | Refills: 0 | OUTPATIENT
Start: 2024-03-14

## 2024-07-23 DIAGNOSIS — B00.1 RECURRENT COLD SORES: ICD-10-CM

## 2024-07-23 RX ORDER — VALACYCLOVIR HYDROCHLORIDE 500 MG/1
TABLET, FILM COATED ORAL
Qty: 90 TABLET | Refills: 0 | OUTPATIENT
Start: 2024-07-23

## 2025-03-09 ENCOUNTER — HEALTH MAINTENANCE LETTER (OUTPATIENT)
Age: 45
End: 2025-03-09